# Patient Record
Sex: MALE | Race: WHITE | NOT HISPANIC OR LATINO | Employment: UNEMPLOYED | URBAN - METROPOLITAN AREA
[De-identification: names, ages, dates, MRNs, and addresses within clinical notes are randomized per-mention and may not be internally consistent; named-entity substitution may affect disease eponyms.]

---

## 2017-12-11 ENCOUNTER — HOSPITAL ENCOUNTER (INPATIENT)
Facility: HOSPITAL | Age: 37
LOS: 4 days | Discharge: HOME | DRG: 751 | End: 2017-12-15
Attending: EMERGENCY MEDICINE | Admitting: PSYCHIATRY & NEUROLOGY
Payer: COMMERCIAL

## 2017-12-11 ENCOUNTER — APPOINTMENT (EMERGENCY)
Dept: RADIOLOGY | Facility: HOSPITAL | Age: 37
DRG: 751 | End: 2017-12-11
Payer: COMMERCIAL

## 2017-12-11 ENCOUNTER — HOSPITAL ENCOUNTER (EMERGENCY)
Facility: HOSPITAL | Age: 37
Discharge: HOME | End: 2017-12-11
Attending: EMERGENCY MEDICINE
Payer: COMMERCIAL

## 2017-12-11 VITALS
RESPIRATION RATE: 18 BRPM | SYSTOLIC BLOOD PRESSURE: 121 MMHG | HEIGHT: 75 IN | HEART RATE: 88 BPM | DIASTOLIC BLOOD PRESSURE: 73 MMHG | BODY MASS INDEX: 28.6 KG/M2 | OXYGEN SATURATION: 99 % | WEIGHT: 230 LBS

## 2017-12-11 DIAGNOSIS — F41.1 GENERALIZED ANXIETY DISORDER: ICD-10-CM

## 2017-12-11 DIAGNOSIS — F32.A DEPRESSION: Primary | ICD-10-CM

## 2017-12-11 DIAGNOSIS — F33.2 SEVERE EPISODE OF RECURRENT MAJOR DEPRESSIVE DISORDER, WITHOUT PSYCHOTIC FEATURES (HCC): Primary | ICD-10-CM

## 2017-12-11 DIAGNOSIS — F11.20 OPIOID DEPENDENCE ON MAINTENANCE AGONIST THERAPY, NO SYMPTOMS (HCC): ICD-10-CM

## 2017-12-11 DIAGNOSIS — G47.00 INSOMNIA: ICD-10-CM

## 2017-12-11 LAB
ALBUMIN SERPL BCP-MCNC: 4.2 G/DL (ref 3.5–5)
ALP SERPL-CCNC: 107 U/L (ref 46–116)
ALT SERPL W P-5'-P-CCNC: 43 U/L (ref 12–78)
AMPHETAMINES SERPL QL SCN: POSITIVE
ANION GAP SERPL CALCULATED.3IONS-SCNC: 14 MMOL/L (ref 4–13)
AST SERPL W P-5'-P-CCNC: 45 U/L (ref 5–45)
BARBITURATES UR QL: NEGATIVE
BASOPHILS # BLD AUTO: 0.02 THOUSANDS/ΜL (ref 0–0.1)
BASOPHILS NFR BLD AUTO: 0 % (ref 0–1)
BENZODIAZ UR QL: POSITIVE
BILIRUB SERPL-MCNC: 0.9 MG/DL (ref 0.2–1)
BUN SERPL-MCNC: 15 MG/DL (ref 5–25)
CALCIUM SERPL-MCNC: 9.1 MG/DL (ref 8.3–10.1)
CHLORIDE SERPL-SCNC: 107 MMOL/L (ref 100–108)
CO2 SERPL-SCNC: 26 MMOL/L (ref 21–32)
COCAINE UR QL: POSITIVE
CREAT SERPL-MCNC: 1.07 MG/DL (ref 0.6–1.3)
EOSINOPHIL # BLD AUTO: 0.28 THOUSAND/ΜL (ref 0–0.61)
EOSINOPHIL NFR BLD AUTO: 3 % (ref 0–6)
ERYTHROCYTE [DISTWIDTH] IN BLOOD BY AUTOMATED COUNT: 14.1 % (ref 11.6–15.1)
ETHANOL EXG-MCNC: 0 MG/DL
ETHANOL SERPL-MCNC: <3 MG/DL (ref 0–3)
GFR SERPL CREATININE-BSD FRML MDRD: 87 ML/MIN/1.73SQ M
GLUCOSE SERPL-MCNC: 117 MG/DL (ref 65–140)
HCT VFR BLD AUTO: 40.4 % (ref 36.5–49.3)
HGB BLD-MCNC: 14.2 G/DL (ref 12–17)
LYMPHOCYTES # BLD AUTO: 1.59 THOUSANDS/ΜL (ref 0.6–4.47)
LYMPHOCYTES NFR BLD AUTO: 19 % (ref 14–44)
MCH RBC QN AUTO: 29.2 PG (ref 26.8–34.3)
MCHC RBC AUTO-ENTMCNC: 35.1 G/DL (ref 31.4–37.4)
MCV RBC AUTO: 83 FL (ref 82–98)
METHADONE UR QL: NEGATIVE
MONOCYTES # BLD AUTO: 0.77 THOUSAND/ΜL (ref 0.17–1.22)
MONOCYTES NFR BLD AUTO: 9 % (ref 4–12)
NEUTROPHILS # BLD AUTO: 5.85 THOUSANDS/ΜL (ref 1.85–7.62)
NEUTS SEG NFR BLD AUTO: 69 % (ref 43–75)
OPIATES UR QL SCN: NEGATIVE
PCP UR QL: NEGATIVE
PLATELET # BLD AUTO: 218 THOUSANDS/UL (ref 149–390)
PMV BLD AUTO: 12 FL (ref 8.9–12.7)
POTASSIUM SERPL-SCNC: 3.7 MMOL/L (ref 3.5–5.3)
PROT SERPL-MCNC: 7.1 G/DL (ref 6.4–8.2)
RBC # BLD AUTO: 4.86 MILLION/UL (ref 3.88–5.62)
SODIUM SERPL-SCNC: 147 MMOL/L (ref 136–145)
THC UR QL: POSITIVE
WBC # BLD AUTO: 8.51 THOUSAND/UL (ref 4.31–10.16)

## 2017-12-11 PROCEDURE — 85025 COMPLETE CBC W/AUTO DIFF WBC: CPT | Performed by: EMERGENCY MEDICINE

## 2017-12-11 PROCEDURE — 99285 EMERGENCY DEPT VISIT HI MDM: CPT

## 2017-12-11 PROCEDURE — 80320 DRUG SCREEN QUANTALCOHOLS: CPT | Performed by: EMERGENCY MEDICINE

## 2017-12-11 PROCEDURE — 82075 ASSAY OF BREATH ETHANOL: CPT | Performed by: EMERGENCY MEDICINE

## 2017-12-11 PROCEDURE — 99284 EMERGENCY DEPT VISIT MOD MDM: CPT

## 2017-12-11 PROCEDURE — 36415 COLL VENOUS BLD VENIPUNCTURE: CPT | Performed by: EMERGENCY MEDICINE

## 2017-12-11 PROCEDURE — 80053 COMPREHEN METABOLIC PANEL: CPT | Performed by: EMERGENCY MEDICINE

## 2017-12-11 PROCEDURE — 73130 X-RAY EXAM OF HAND: CPT

## 2017-12-11 PROCEDURE — 80307 DRUG TEST PRSMV CHEM ANLYZR: CPT | Performed by: EMERGENCY MEDICINE

## 2017-12-11 RX ORDER — TRAZODONE HYDROCHLORIDE 50 MG/1
50 TABLET ORAL
COMMUNITY
End: 2017-12-15 | Stop reason: HOSPADM

## 2017-12-11 RX ORDER — TRAZODONE HYDROCHLORIDE 50 MG/1
50 TABLET ORAL
Status: DISCONTINUED | OUTPATIENT
Start: 2017-12-11 | End: 2017-12-15 | Stop reason: HOSPADM

## 2017-12-11 RX ORDER — HALOPERIDOL 5 MG
5 TABLET ORAL EVERY 6 HOURS PRN
Status: DISCONTINUED | OUTPATIENT
Start: 2017-12-11 | End: 2017-12-15 | Stop reason: HOSPADM

## 2017-12-11 RX ORDER — OLANZAPINE 10 MG/1
10 TABLET ORAL
Status: DISCONTINUED | OUTPATIENT
Start: 2017-12-11 | End: 2017-12-15 | Stop reason: HOSPADM

## 2017-12-11 RX ORDER — BUPRENORPHINE HYDROCHLORIDE AND NALOXONE HYDROCHLORIDE DIHYDRATE 8; 2 MG/1; MG/1
1 TABLET SUBLINGUAL DAILY
Status: ON HOLD | COMMUNITY
End: 2017-12-14

## 2017-12-11 RX ORDER — MAGNESIUM HYDROXIDE/ALUMINUM HYDROXICE/SIMETHICONE 120; 1200; 1200 MG/30ML; MG/30ML; MG/30ML
30 SUSPENSION ORAL EVERY 4 HOURS PRN
Status: DISCONTINUED | OUTPATIENT
Start: 2017-12-11 | End: 2017-12-15 | Stop reason: HOSPADM

## 2017-12-11 RX ORDER — DIAZEPAM 10 MG/1
5 TABLET ORAL EVERY 6 HOURS PRN
COMMUNITY
End: 2017-12-15 | Stop reason: HOSPADM

## 2017-12-11 RX ORDER — BUPROPION HYDROCHLORIDE 300 MG/1
300 TABLET ORAL DAILY
COMMUNITY
End: 2017-12-15 | Stop reason: HOSPADM

## 2017-12-11 RX ORDER — LORAZEPAM 2 MG/ML
2 INJECTION INTRAMUSCULAR EVERY 6 HOURS PRN
Status: DISCONTINUED | OUTPATIENT
Start: 2017-12-11 | End: 2017-12-15 | Stop reason: HOSPADM

## 2017-12-11 RX ORDER — BENZTROPINE MESYLATE 1 MG/1
1 TABLET ORAL EVERY 6 HOURS PRN
Status: DISCONTINUED | OUTPATIENT
Start: 2017-12-11 | End: 2017-12-15 | Stop reason: HOSPADM

## 2017-12-11 RX ORDER — BENZTROPINE MESYLATE 1 MG/ML
1 INJECTION INTRAMUSCULAR; INTRAVENOUS EVERY 6 HOURS PRN
Status: DISCONTINUED | OUTPATIENT
Start: 2017-12-11 | End: 2017-12-15 | Stop reason: HOSPADM

## 2017-12-11 RX ORDER — BUPRENORPHINE AND NALOXONE 8; 2 MG/1; MG/1
1 FILM, SOLUBLE BUCCAL; SUBLINGUAL DAILY
Status: DISCONTINUED | OUTPATIENT
Start: 2017-12-11 | End: 2017-12-15 | Stop reason: HOSPADM

## 2017-12-11 RX ORDER — ACETAMINOPHEN 325 MG/1
650 TABLET ORAL EVERY 6 HOURS PRN
Status: DISCONTINUED | OUTPATIENT
Start: 2017-12-11 | End: 2017-12-15 | Stop reason: HOSPADM

## 2017-12-11 RX ORDER — RISPERIDONE 1 MG/1
1 TABLET, ORALLY DISINTEGRATING ORAL
Status: DISCONTINUED | OUTPATIENT
Start: 2017-12-11 | End: 2017-12-15 | Stop reason: HOSPADM

## 2017-12-11 RX ORDER — OLANZAPINE 10 MG/1
10 INJECTION, POWDER, LYOPHILIZED, FOR SOLUTION INTRAMUSCULAR
Status: DISCONTINUED | OUTPATIENT
Start: 2017-12-11 | End: 2017-12-15 | Stop reason: HOSPADM

## 2017-12-11 RX ORDER — HALOPERIDOL 5 MG/ML
5 INJECTION INTRAMUSCULAR EVERY 6 HOURS PRN
Status: DISCONTINUED | OUTPATIENT
Start: 2017-12-11 | End: 2017-12-15 | Stop reason: HOSPADM

## 2017-12-11 RX ORDER — HYDROXYZINE 50 MG/1
50 TABLET, FILM COATED ORAL EVERY 6 HOURS PRN
Status: DISCONTINUED | OUTPATIENT
Start: 2017-12-11 | End: 2017-12-15 | Stop reason: HOSPADM

## 2017-12-11 RX ADMIN — BUPRENORPHINE HYDROCHLORIDE, NALOXONE HYDROCHLORIDE 1 FILM: 8; 2 FILM, SOLUBLE BUCCAL; SUBLINGUAL at 15:35

## 2017-12-11 NOTE — PROGRESS NOTES
Pt remains mostly seclusive to room  Calm and cooperative during assessment with this writer however has slight irritable edge  Pt states feeling tired and wanting to rest however mostly awake this evening  Pt report SI-no plan  Verbally contracts for safety  Denies HI, AH/VH  Denies w/d s/s and would not discuss substance abuse with this writer  Remains scant and guarded  States "I'm ok right now, I just want to rest " Encouraged pt to notify staff if having any concerns or questions  Pt agreeable  Will monitor

## 2017-12-11 NOTE — ED NOTES
Pt to ED for second time, had left ED discharged, but when police were called to remove him from property, pt told police he would walk into traffic  Pt received another DUI yesterday and has been kicked out by his father earlier out of his car into the parking lot  Police present and filed a [phone] petition for pt making suicidal statements  Police present while pt changed into blue paper clothing, gave urine sample and blood drawn for basic labs  Pt given water, taking PO fluids well       Gauri Bird RN  12/11/17 Angella Pitts RN  12/11/17 2547

## 2017-12-11 NOTE — DISCHARGE INSTRUCTIONS
Depression   WHAT YOU NEED TO KNOW:   Depression is a medical condition that causes feelings of sadness or hopelessness that do not go away  Depression may cause you to lose interest in things you used to enjoy  These feelings may interfere with your daily life  DISCHARGE INSTRUCTIONS:   Call 911 for any of the following:   · You think about harming yourself or someone else  Contact your healthcare provider if:   · Your symptoms do not improve  · You cannot make it to your next appointment  · You have new symptoms  · You have questions or concerns about your condition or care  Medicines:   · Antidepressants  may be given to improve or balance your mood  You may need to take this medicine for several weeks before you begin to feel better  · Take your medicine as directed  Contact your healthcare provider if you think your medicine is not helping or if you have side effects  Tell him of her if you are allergic to any medicine  Keep a list of the medicines, vitamins, and herbs you take  Include the amounts, and when and why you take them  Bring the list or the pill bottles to follow-up visits  Carry your medicine list with you in case of an emergency  Therapy  may be used to treat your depression  A therapist will help you learn to cope with your thoughts and feelings  This can be done alone or in a group  It may also be done with family members or a significant other  Self-care:   · Get regular physical activity  Try to exercise for 30 minutes, 3 to 5 days a week  Work with your healthcare provider to develop an exercise plan that you enjoy  Physical activity may improve your symptoms  · Get enough sleep  Create a routine to help you relax before bed  You can listen to music, read, or do yoga  Try to go to bed and wake up at the same time every day  Sleep is important for emotional health  · Eat a variety of healthy foods from all of the food groups    A healthy meal plan is low in fat, salt, and added sugar  Ask your healthcare provider for more information about a meal plan that is right for you  · Do not drink alcohol or use drugs  Alcohol and drugs can make your symptoms worse  Follow up with your healthcare provider as directed: Your healthcare provider will monitor your progress at follow-up visits  He or she will also monitor your medicine if you take antidepressants  Your healthcare provider will ask if the medicine is helping  Tell him or her about any side effects or problems you may have with your medicine  The type or amount of medicine may need to be changed  Write down your questions so you remember to ask them during your visits  © 2017 [employer] Information is for End User's use only and may not be sold, redistributed or otherwise used for commercial purposes  All illustrations and images included in CareNotes® are the copyrighted property of [employer]  or Alistair Neff  The above information is an  only  It is not intended as medical advice for individual conditions or treatments  Talk to your doctor, nurse or pharmacist before following any medical regimen to see if it is safe and effective for you

## 2017-12-11 NOTE — ED NOTES
[facility] from Crisis states this pt is in for a bed search but waiting for discharges        Deny Jorgensen  12/11/17 3221

## 2017-12-11 NOTE — ED NOTES
Pt to ED after being kicked out of his father's car on the property  Pt will not comment on drug abuse  Pt not cooperative with assessment and will not change into blue scrubs at this time  Pt does deny SI/HI and any type of auditory or visual hallucinations       Vandana Goyal RN  12/11/17 2182

## 2017-12-11 NOTE — ED NOTES
Patient reportedly called his father from [facility] asking for a ride to this area earlier today  His father refused unless the patient agreed to "get help"  The  involved says that the patient agreed until his father arrived, then pt got into father's car and stated he wasn't going anywhere to get help  Reportedly police in [place] were involved in getting the patient to either agree to treatment or leave his father's car  The pt again agreed and father drove him to this ED  Pt then refused to get out of the car and said he wouldn't get help  The police arrived on the scene and ultimately the father got the patient out of his car and drove away  Pt was sent into the ED by the police  Once inside, the pt refused to change, denied si, hi, psychosis, refused a drug screen, and said he needed and wanted nothing  He was discharged and went to the lobby to try to call his father to come get him  Father refused, and the patient refused to leave the waiting room  Eventually the police were called back and they told the patient he had to leave the hospital grounds  The pt then told the officer that if he was forced to leave the hospital lobby he would walk [place] traffic [address] to try to kill himself  Pt also said that he was more depressed recently, and that he had felt like this in the past and tried to harm himself when he did  Officer petitioned a 36, but the patient stated he wanted to sign in voluntarily  Once the police left the ED the pt then denied being suicidal and said he was able to "contract for safety"  Pt reported several seemingly contradictory residences [facility] for the past 6 months, [place] where he said he sees a psychiatrist monthly, [place] where his father picked him up)  Throughout the evaluation the pt kept rolling his eyes back behind the lids and mumbling and pretending to fall asleep   He refused to answer several questions, but consistently denied HI and psychosis

## 2017-12-11 NOTE — ED NOTES
Patient denied substance abuse aside from suboxone which he said was prescribed for him by "some doctor in [facility]" for the past 10 years  His drug screen was positive for amphetamines/methamphetamines, benzos, cocaine and thc  He denied using any of those drugs, and wouldn't answer and pretending to fall asleep when questioned about the positive drug screen

## 2017-12-11 NOTE — ED PROVIDER NOTES
History  Chief Complaint   Patient presents with    Psychiatric Evaluation     Pt to ED via [facility] police, 396, pt told them he was going to walk into traffic  Brought in by police for 025  Pt in department earlier for depression - not very cooperative with interview stating he was being forced to come in by father  Hx of depression w/ previous suicide attempt  Earlier had denied SI/HI  Pt later found loitering on property and police called  Pt reportedly made statements to police that he was going to jump into traffic to kill himself  Pt w/ previous psychiatric admissions for depression in 2014 and 2015  Hx of substance abuse  Refuses to answer any questions regarding substance abuse, denies alcohol use  Pt     Of note dorsum of right hand is swollen and reddened  Mild increased warmth  Denies any specific injury to the hand  Notes he was in handcuffs 'for an extended period of time' on Monday after being found sleeping in his truck by a [facility]  States officer was concerned he was driving under the influence and had labs done  Prior to Admission Medications   Prescriptions Last Dose Informant Patient Reported? Taking?    buPROPion (WELLBUTRIN XL) 300 mg 24 hr tablet Past Week at Unknown time  Yes Yes   Sig: Take 300 mg by mouth daily   buprenorphine-naloxone (SUBOXONE) 8-2 mg per SL tablet 12/10/2017 at Unknown time  Yes Yes   Sig: Place 1 tablet under the tongue daily   diazepam (VALIUM) 10 mg tablet Past Month at Unknown time  Yes Yes   Sig: Take 5 mg by mouth every 6 (six) hours as needed for anxiety   traZODone (DESYREL) 50 mg tablet More than a month at Unknown time  Yes No   Sig: Take 50 mg by mouth daily at bedtime      Facility-Administered Medications: None       Past Medical History:   Diagnosis Date    Alcohol abuse     Depression     Substance abuse        Past Surgical History:   Procedure Laterality Date    ABDOMINAL SURGERY      ANKLE SURGERY History reviewed  No pertinent family history  I have reviewed and agree with the history as documented      Social History   Substance Use Topics    Smoking status: Current Every Day Smoker     Packs/day: 1 00     Years: 15 00     Types: Cigarettes    Smokeless tobacco: Current User    Alcohol use No      Comment: Denies use, recent DUI        Review of Systems    Physical Exam  ED Triage Vitals   Temperature Pulse Respirations Blood Pressure SpO2   12/11/17 0422 12/11/17 0422 12/11/17 0422 12/11/17 0422 12/11/17 0422   (!) 96 5 °F (35 8 °C) 85 18 123/65 98 %      Temp Source Heart Rate Source Patient Position - Orthostatic VS BP Location FiO2 (%)   12/11/17 1254 12/11/17 0422 12/11/17 0422 12/11/17 0422 --   Tympanic Monitor Lying Right arm       Pain Score       12/11/17 0422       No Pain           Orthostatic Vital Signs  Vitals:    12/13/17 0728 12/13/17 1226 12/13/17 1540 12/13/17 2005   BP: 112/58 140/87 124/78 122/77   Pulse: 72 92 71 77   Patient Position - Orthostatic VS: Lying Standing Standing Sitting       Physical Exam    ED Medications  Medications   hydrOXYzine HCL (ATARAX) tablet 50 mg (50 mg Oral Not Given 12/12/17 1010)   LORazepam (ATIVAN) 2 mg/mL injection 2 mg (not administered)   traZODone (DESYREL) tablet 50 mg (not administered)   risperiDONE (RisperDAL M-TABS) dispersible tablet 1 mg (not administered)   nicotine polacrilex (NICORETTE) gum 2 mg (not administered)   haloperidol (HALDOL) tablet 5 mg (not administered)   haloperidol lactate (HALDOL) injection 5 mg (not administered)   OLANZapine (ZyPREXA) tablet 10 mg (not administered)   OLANZapine (ZyPREXA) IM injection 10 mg (not administered)   magnesium hydroxide (MILK OF MAGNESIA) 400 mg/5 mL oral suspension 30 mL (not administered)   aluminum-magnesium hydroxide-simethicone (MYLANTA) 200-200-20 mg/5 mL oral suspension 30 mL (not administered)   acetaminophen (TYLENOL) tablet 650 mg (not administered)   benztropine (COGENTIN) tablet 1 mg (not administered)   benztropine (COGENTIN) injection 1 mg (not administered)   buprenorphine-naloxone (SUBOXONE) 8-2 mg per SL film 1 Film (1 Film Sublingual Given 12/13/17 0944)   influenza inactivated quadrivalent vaccine (FLULAVAL) IM injection 0 5 mL (not administered)   nicotine (NICODERM CQ) 21 mg/24 hr TD 24 hr patch 1 patch (1 patch Transdermal Medication Applied 12/13/17 0944)   buPROPion (WELLBUTRIN XL) 24 hr tablet 300 mg (300 mg Oral Given 12/13/17 0943)   buprenorphine-naloxone (SUBOXONE) 2-0 5 mg per SL film 2 Film (2 Film Sublingual Given 12/13/17 0944)   zolpidem (AMBIEN) tablet 10 mg (10 mg Oral Given 12/13/17 2123)       Diagnostic Studies  Results Reviewed     Procedure Component Value Units Date/Time    Comprehensive metabolic panel [[id number]]  (Abnormal) Collected:  12/11/17 0415    Lab Status:  Final result Specimen:  Blood from Arm, Left Updated:  12/11/17 0450     Sodium 147 (H) mmol/L      Potassium 3 7 mmol/L      Chloride 107 mmol/L      CO2 26 mmol/L      Anion Gap 14 (H) mmol/L      BUN 15 mg/dL      Creatinine 1 07 mg/dL      Glucose 117 mg/dL      Calcium 9 1 mg/dL      AST 45 U/L      ALT 43 U/L      Alkaline Phosphatase 107 U/L      Total Protein 7 1 g/dL      Albumin 4 2 g/dL      Total Bilirubin 0 90 mg/dL      eGFR 87 ml/min/1 73sq m     Narrative:         National Kidney Disease Education Program recommendations are as follows:  GFR calculation is accurate only with a steady state creatinine  Chronic Kidney disease less than 60 ml/min/1 73 sq  meters  Kidney failure less than 15 ml/min/1 73 sq  meters      Ethanol [[id number]]  (Normal) Collected:  12/11/17 0415    Lab Status:  Final result Specimen:  Blood from Arm, Left Updated:  12/11/17 0446     Ethanol Lvl <3 mg/dL     Rapid drug screen, urine [[id number]]  (Abnormal) Collected:  12/11/17 0412    Lab Status:  Final result Specimen:  Urine from Urine, Clean Catch Updated:  12/11/17 0435     Amph/Meth UR Positive (A) Barbiturate Ur Negative     Benzodiazepine Urine Positive (A)     Cocaine Urine Positive (A)     Methadone Urine Negative     Opiate Urine Negative     PCP Ur Negative     THC Urine Positive (A)    Narrative:         Presumptive report  If requested, specimen will be sent to reference lab for confirmation  FOR MEDICAL PURPOSES ONLY  IF CONFIRMATION NEEDED PLEASE CONTACT THE LAB WITHIN 5 DAYS  Drug Screen Cutoff Levels:  AMPHETAMINE/METHAMPHETAMINES  1000 ng/mL  BARBITURATES     200 ng/mL  BENZODIAZEPINES     200 ng/mL  COCAINE      300 ng/mL  METHADONE      300 ng/mL  OPIATES      300 ng/mL  PHENCYCLIDINE     25 ng/mL  THC       50 ng/mL    CBC and differential [[id number]]  (Normal) Collected:  12/11/17 0418    Lab Status:  Final result Specimen:  Blood from Arm, Left Updated:  12/11/17 0426     WBC 8 51 Thousand/uL      RBC 4 86 Million/uL      Hemoglobin 14 2 g/dL      Hematocrit 40 4 %      MCV 83 fL      MCH 29 2 pg      MCHC 35 1 g/dL      RDW 14 1 %      MPV 12 0 fL      Platelets 416 Thousands/uL      Neutrophils Relative 69 %      Lymphocytes Relative 19 %      Monocytes Relative 9 %      Eosinophils Relative 3 %      Basophils Relative 0 %      Neutrophils Absolute 5 85 Thousands/µL      Lymphocytes Absolute 1 59 Thousands/µL      Monocytes Absolute 0 77 Thousand/µL      Eosinophils Absolute 0 28 Thousand/µL      Basophils Absolute 0 02 Thousands/µL                  XR hand 3+ views RIGHT   Final Result by Amelia Cooley MD (12/11 9900)      No acute osseous abnormality           Workstation performed: [id number]                    Procedures  Procedures       Phone Contacts  ED Phone Contact    ED Course  ED Course                                MDM    CritCare Time    Disposition  Final diagnoses:   Severe episode of recurrent major depressive disorder, without psychotic features ([facility] [address] )     Time reflects when diagnosis was documented in both MDM as applicable and the Disposition within this note Time User Action Codes Description Comment    12/14/2017  6:56 AM Maximiliano Kennedy Add [F33 2] Severe episode of recurrent major depressive disorder, without psychotic features [facility])       ED Disposition     None      MD Documentation    Flowsheet Row Most Recent Value   Sending MD Kennedy      Follow-up Information     Follow up With Specialties Details Why Contact Info    Toy Cuevas MD  Go on 1/4/2018 @ 3:30 PM: medication management appointment [address] [place] [address]          Current Discharge Medication List      CONTINUE these medications which have NOT CHANGED    Details   buprenorphine-naloxone (SUBOXONE) 8-2 mg per SL tablet Place 1 tablet under the tongue daily      buPROPion (WELLBUTRIN XL) 300 mg 24 hr tablet Take 300 mg by mouth daily      diazepam (VALIUM) 10 mg tablet Take 5 mg by mouth every 6 (six) hours as needed for anxiety      traZODone (DESYREL) 50 mg tablet Take 50 mg by mouth daily at bedtime           No discharge procedures on file      ED Provider  Electronically Signed by           Radha Owens DO  12/14/17 5429

## 2017-12-11 NOTE — ED PROVIDER NOTES
History  Chief Complaint   Patient presents with    Depression     Pt reports to ED because his dad thinks he needs to be hospitalized, has been depressed for some time, but denies SI/HI  Pt dropped off by father for evaluation  Reportedly father concerned pt w/ worsening depression  Father not here to provide information  Pt states hx of depression w/ suicide attempt in past   On meds but doesn't feel like they are working  Doesn't have a therapist/counselor  Refuses to answer any questions regarding substance use/abuse  Pt denies any SI/HI  Denies vis/aud hallucinations  Does not want to speak to crisis        History provided by:  Patient   used: No    Depression   Presenting symptoms: depression    Presenting symptoms: no suicidal thoughts and no suicidal threats    Patient accompanied by: none  Degree of incapacity (severity): Unable to specify  Onset quality:  Gradual  Timing:  Constant  Progression:  Waxing and waning  Chronicity:  Recurrent  Context: not recent medication change and not stressful life event    Treatment compliance: All of the time  Relieved by:  None tried  Worsened by:  Nothing  Ineffective treatments:  None tried  Associated symptoms comment:  Refusing to answer any other questions  Risk factors: hx of mental illness        Prior to Admission Medications   Prescriptions Last Dose Informant Patient Reported? Taking? buPROPion (WELLBUTRIN XL) 300 mg 24 hr tablet   Yes Yes   Sig: Take 300 mg by mouth daily   diazepam (VALIUM) 10 mg tablet   Yes Yes   Sig: Take 5 mg by mouth every 6 (six) hours as needed for anxiety   traZODone (DESYREL) 50 mg tablet   Yes Yes   Sig: Take 50 mg by mouth daily at bedtime      Facility-Administered Medications: None       History reviewed  No pertinent past medical history  Past Surgical History:   Procedure Laterality Date    ABDOMINAL SURGERY      ANKLE SURGERY         History reviewed   No pertinent family history  I have reviewed and agree with the history as documented  Social History   Substance Use Topics    Smoking status: Current Every Day Smoker    Smokeless tobacco: Current User    Alcohol use No        Review of Systems   Unable to perform ROS: Psychiatric disorder   Psychiatric/Behavioral: Positive for depression  Negative for suicidal ideas  Physical Exam  ED Triage Vitals [12/11/17 0248]   Temp Pulse Respirations Blood Pressure SpO2   -- 88 18 121/73 99 %      Temp src Heart Rate Source Patient Position - Orthostatic VS BP Location FiO2 (%)   -- Monitor Sitting Right arm --      Pain Score       --           Orthostatic Vital Signs  Vitals:    12/11/17 0248   BP: 121/73   Pulse: 88   Patient Position - Orthostatic VS: Sitting       Physical Exam   Constitutional: He appears well-developed and well-nourished  HENT:   Nose: Nose normal    Eyes: Conjunctivae are normal    Neck: Neck supple  Cardiovascular: Normal rate  Pulmonary/Chest: Effort normal    Neurological: He is alert  Skin: Skin is warm  Psychiatric: His affect is inappropriate  He is withdrawn  He expresses no homicidal and no suicidal ideation  He expresses no suicidal plans and no homicidal plans  Nursing note and vitals reviewed        ED Medications  Medications - No data to display    Diagnostic Studies  Results Reviewed     Procedure Component Value Units Date/Time    POCT alcohol breath test [[id number]]  (Normal) Resulted:  12/11/17 0255    Lab Status:  Final result Updated:  12/11/17 0255     EXTBreath Alcohol 0 00                 No orders to display              Procedures  Procedures       Phone Contacts  ED Phone Contact    ED Course  ED Course                                MDM  Number of Diagnoses or Management Options  Depression: established and worsening    CritCare Time    Disposition  Final diagnoses:   Depression     Time reflects when diagnosis was documented in both MDM as applicable and the Disposition within this note     Time User Action Codes Description Comment    12/11/2017  3:01 AM Mayra Romanian Add [F32 9] Depression       ED Disposition     ED Disposition Condition Comment    Discharge  Nedonna Jeffers discharge to home/self care  Condition at discharge: Good        Follow-up Information     Follow up With Specialties Details Why [facility] Schedule an appointment as soon as possible for a visit As needed [address] [address] [address]          Discharge Medication List as of 12/11/2017  3:02 AM      CONTINUE these medications which have NOT CHANGED    Details   buPROPion (WELLBUTRIN XL) 300 mg 24 hr tablet Take 300 mg by mouth daily, Historical Med      diazepam (VALIUM) 10 mg tablet Take 5 mg by mouth every 6 (six) hours as needed for anxiety, Historical Med      traZODone (DESYREL) 50 mg tablet Take 50 mg by mouth daily at bedtime, Historical Med           No discharge procedures on file      ED Provider  Electronically Signed by           Wilbert Wheeler DO  12/11/17 0503

## 2017-12-11 NOTE — ED NOTES
Pt provided with safe finger food breakfast tray and is currently eating        Little Dye, RN  12/11/17 2006

## 2017-12-11 NOTE — ED NOTES
RN updating pt about process and the possibility that labs may be ordered, pt states "well that depends on what the labs are for, I will not submit to a drug test "     Larry Gil RN  12/11/17 4750

## 2017-12-11 NOTE — ED NOTES
Patient signed a 12  EVS indicates he is MA eligible through [facility]  Crisis worker contacted [facility] to obtain preauthorization  Currently in the [facility] return call que awaiting call

## 2017-12-11 NOTE — PLAN OF CARE
RN will meet with patient twice a day to assess for any concerns, teach about prescribed medications and diagnoses  Patient will be taught and encouraged to utilize healthy coping skills

## 2017-12-11 NOTE — ED NOTES
Pt appears very sleepy, is easily arousable when spoken to but unable to keep eyes fully open for more than a few seconds, speak slurred, vitals stable as noted, Dr Ciarra Alicia made aware        Lin Arrington RN  12/11/17 5653

## 2017-12-11 NOTE — PROGRESS NOTES
[address]- Patient expressing suicidal ideation (running into traffic) after father refused to get him  Came into unit with abrasions on wrists  Wrists were red and slightly swollen  Xrays were taken at ED, negative for fracture  Patient said that he is homeless  Recently in residential for DUI, although denied all alcohol use  Said that he used opiates but would not specify which he used and how much  Barely cooperative  Patient had past history of MRSA in L ankle many years ago continues to have pain with that injury  Currently is on Suboxone maintenance  Made MD aware

## 2017-12-11 NOTE — ED NOTES
Lesvia Martinez, [facility], authorized 2 nights acute/dual inpatient treatment  [place] requests that accepting facility contact them upon admission to receive authorization number  [phone]

## 2017-12-12 PROBLEM — F14.10 COCAINE ABUSE (HCC): Status: ACTIVE | Noted: 2017-12-12

## 2017-12-12 PROBLEM — F41.1 GENERALIZED ANXIETY DISORDER: Status: ACTIVE | Noted: 2017-12-12

## 2017-12-12 PROBLEM — F33.2 SEVERE EPISODE OF RECURRENT MAJOR DEPRESSIVE DISORDER, WITHOUT PSYCHOTIC FEATURES (HCC): Status: ACTIVE | Noted: 2017-12-12

## 2017-12-12 RX ORDER — BUPROPION HYDROCHLORIDE 300 MG/1
300 TABLET ORAL DAILY
Status: DISCONTINUED | OUTPATIENT
Start: 2017-12-12 | End: 2017-12-15 | Stop reason: HOSPADM

## 2017-12-12 RX ORDER — BUPRENORPHINE AND NALOXONE 2; .5 MG/1; MG/1
2 FILM, SOLUBLE BUCCAL; SUBLINGUAL DAILY
Status: DISCONTINUED | OUTPATIENT
Start: 2017-12-12 | End: 2017-12-15 | Stop reason: HOSPADM

## 2017-12-12 RX ORDER — NICOTINE 21 MG/24HR
1 PATCH, TRANSDERMAL 24 HOURS TRANSDERMAL DAILY
Status: DISCONTINUED | OUTPATIENT
Start: 2017-12-12 | End: 2017-12-15 | Stop reason: HOSPADM

## 2017-12-12 RX ORDER — ZOLPIDEM TARTRATE 5 MG/1
5 TABLET ORAL
Status: DISCONTINUED | OUTPATIENT
Start: 2017-12-12 | End: 2017-12-13

## 2017-12-12 RX ADMIN — BUPROPION HYDROCHLORIDE 300 MG: 300 TABLET, FILM COATED, EXTENDED RELEASE ORAL at 10:11

## 2017-12-12 RX ADMIN — BUPRENORPHINE HYDROCHLORIDE, NALOXONE HYDROCHLORIDE 1 FILM: 8; 2 FILM, SOLUBLE BUCCAL; SUBLINGUAL at 10:10

## 2017-12-12 RX ADMIN — NICOTINE 1 PATCH: 21 PATCH, EXTENDED RELEASE TRANSDERMAL at 10:10

## 2017-12-12 RX ADMIN — BUPRENORPHINE HYDROCHLORIDE, NALOXONE HYDROCHLORIDE 2 FILM: 2; .5 FILM, SOLUBLE BUCCAL; SUBLINGUAL at 12:06

## 2017-12-12 RX ADMIN — HYDROXYZINE HYDROCHLORIDE 50 MG: 50 TABLET, FILM COATED ORAL at 10:09

## 2017-12-12 RX ADMIN — ZOLPIDEM TARTRATE 5 MG: 5 TABLET ORAL at 22:03

## 2017-12-12 NOTE — H&P
Chief Complaint:  As per psychiatry      History of Present Illness:  26-year-old male with depression who was brought into the emergency department due to being destructive a property  Patient with history of depression and was making suicidal threats  Patient without hallucinations  Patient's drug screen positive for cocaine,  Benzos, amphetamines and THC  Patient denies fevers, chills, shortness of breath, chest pain or palpitation  Patient without any co morbidities  Past Medical History:   Past Medical History:   Diagnosis Date    Alcohol abuse     Depression     Substance abuse          Past Surgical History:    Past Surgical History:   Procedure Laterality Date    ABDOMINAL SURGERY      ANKLE SURGERY           Allergies:     Allergies   Allergen Reactions    Nuts Anaphylaxis     [place]    Vancomycin Anaphylaxis    Venomil Honey Bee Venom [Honey Bee Venom] Anaphylaxis         Medications:    Current Facility-Administered Medications:     acetaminophen (TYLENOL) tablet 650 mg, 650 mg, Oral, Q6H PRN, Silke Long PA-C    aluminum-magnesium hydroxide-simethicone (MYLANTA) 200-200-20 mg/5 mL oral suspension 30 mL, 30 mL, Oral, Q4H PRN, Silke Long PA-C    benztropine (COGENTIN) injection 1 mg, 1 mg, Intramuscular, Q6H PRN, Silke Long PA-C    benztropine (COGENTIN) tablet 1 mg, 1 mg, Oral, Q6H PRN, Silke Long PA-C    buprenorphine-naloxone (SUBOXONE) 8-2 mg per SL film 1 Film, 1 Film, Sublingual, Daily, Charles Franco, 1 Film at 12/11/17 1535    haloperidol (HALDOL) tablet 5 mg, 5 mg, Oral, Q6H PRN, Silke Long PA-C    haloperidol lactate (HALDOL) injection 5 mg, 5 mg, Intramuscular, Q6H PRN, Silke Long PA-C    hydrOXYzine HCL (ATARAX) tablet 50 mg, 50 mg, Oral, Q6H PRN, Silke Long PA-C    influenza inactivated quadrivalent vaccine (FLULAVAL) IM injection 0 5 mL, 0 5 mL, Intramuscular, Prior to discharge, Charles Franco    LORazepam (ATIVAN) 2 mg/mL injection 2 mg, 2 mg, Intramuscular, Q6H PRN, Tray Dart, PA-C    magnesium hydroxide (MILK OF MAGNESIA) 400 mg/5 mL oral suspension 30 mL, 30 mL, Oral, Daily PRN, Tray Dart, PA-C    nicotine polacrilex (NICORETTE) gum 2 mg, 2 mg, Oral, Q2H PRN, Tray Dart, PA-C    OLANZapine (ZyPREXA) IM injection 10 mg, 10 mg, Intramuscular, Q3H PRN, Tray Dart, PA-C    OLANZapine (ZyPREXA) tablet 10 mg, 10 mg, Oral, Q3H PRN, Tray Dart, PA-C    risperiDONE (RisperDAL M-TABS) dispersible tablet 1 mg, 1 mg, Oral, Q3H PRN, Tray Dart, PA-C    traZODone (DESYREL) tablet 50 mg, 50 mg, Oral, HS PRN, Tray Dart, PA-C      Social History:  Social History     History   Alcohol Use No     Comment: Denies use, recent DUI     History   Drug Use    Types: Amphetamines, Benzodiazepines, Marijuana, Cocaine     Comment: Said opiates, not specific, UDS +     History   Smoking Status    Current Every Day Smoker    Packs/day: 1 00    Years: 15 00    Types: Cigarettes   Smokeless Tobacco    Current User         Family History:  History reviewed  No pertinent family history  Review of Systems:    negative for, as above, fever, chills, night sweats, weight loss, weight gain, headaches, dizziness, fatigue and weakness    Vitals:  Vitals:    12/12/17 0725   BP: 126/75   Pulse: 71   Resp: 18   Temp: 98 2 °F (36 8 °C)   SpO2:        Physical Exam:  HEENT: Normocephalic, atraumatic, PER EOMI, nonicteric, trachea normal, thyroid normal, oropharynx normal   CARDIAC: regular rate & rhythm, S1 & S2 normal   No heaves, thrills, gallops or murmurs  LUNGS: Clear to auscultation, no spinal or CV tenderness  ABDOMEN: flat, negative hepatosplenomegaly, soft and non-tender  EXTREMITIES: No evidence of cyanosis, clubbing or edema  Lab Results: I have personally reviewed pertinent reports  See below  Imaging: I have personally reviewed pertinent reports     EKG, Pathology, and Other Studies: I have personally reviewed pertinent reports       Admission on 12/11/2017   Component Date Value    WBC 12/11/2017 8 51     RBC 12/11/2017 4 86     Hemoglobin 12/11/2017 14 2     Hematocrit 12/11/2017 40 4     MCV 12/11/2017 83     MCH 12/11/2017 29 2     MCHC 12/11/2017 35 1     RDW 12/11/2017 14 1     MPV 12/11/2017 12 0     Platelets 52/39/3527 218     Neutrophils Relative 12/11/2017 69     Lymphocytes Relative 12/11/2017 19     Monocytes Relative 12/11/2017 9     Eosinophils Relative 12/11/2017 3     Basophils Relative 12/11/2017 0     Neutrophils Absolute 12/11/2017 5 85     Lymphocytes Absolute 12/11/2017 1 59     Monocytes Absolute 12/11/2017 0 77     Eosinophils Absolute 12/11/2017 0 28     Basophils Absolute 12/11/2017 0 02     Sodium 12/11/2017 147*    Potassium 12/11/2017 3 7     Chloride 12/11/2017 107     CO2 12/11/2017 26     Anion Gap 12/11/2017 14*    BUN 12/11/2017 15     Creatinine 12/11/2017 1 07     Glucose 12/11/2017 117     Calcium 12/11/2017 9 1     AST 12/11/2017 45     ALT 12/11/2017 43     Alkaline Phosphatase 12/11/2017 107     Total Protein 12/11/2017 7 1     Albumin 12/11/2017 4 2     Total Bilirubin 12/11/2017 0 90     eGFR 12/11/2017 87     Amph/Meth UR 12/11/2017 Positive*    Barbiturate Ur 12/11/2017 Negative     Benzodiazepine Urine 12/11/2017 Positive*    Cocaine Urine 12/11/2017 Positive*    Methadone Urine 12/11/2017 Negative     Opiate Urine 12/11/2017 Negative     PCP Ur 12/11/2017 Negative     THC Urine 12/11/2017 Positive*    Ethanol Lvl 12/11/2017 <3          Impression:  Depression  Polysubstance abuse  Tobacco dependent    Plan:  Inpatient psychiatric management and treatment  What for signs of withdrawal  Nicotine patch

## 2017-12-12 NOTE — H&P
Psychiatric Evaluation - 53 [facility] 42 y o  male MRN: [id number]  Unit/Bed#: U 263-01 Encounter: [id number]    Assessment/Plan   Principal Problem:    Severe episode of recurrent major depressive disorder, without psychotic features ([address] )  Active Problems:    Generalized anxiety disorder    Cocaine abuse    Plan:   1  Check admission labs  2  Collaborate with family for baseline assessment and disposition planning  3   Continue Wellbutrin  mg daily for depression management  4   Continue Suboxone 12-3 mg SL daily-patient stabilized on this dose prior to admission  5   Hydroxyzine 50 mg q 6 hours p r n  for anxiety  I will discuss with his outpatient psychiatrist regarding option for anxiety management  6   Vitals Q 4 hourly to monitor for benzodiazepine withdrawal   I will decide accordingly if benzodiazepines are indicated  Risks, benefits and possible side effects of Medications:   Risks, benefits, and possible side effects of medications explained to patient and patient verbalizes understanding  Chief Complaint: "lot is going on in life"    History of Present Illness     Patient is a 42 y o  male presents on 61 51 81 with depression and passive death wishes  According to emergency room note done by Dr Sanchez Persons, DO:-     Depression       Pt reports to ED because his dad thinks he needs to be hospitalized, has been depressed for some time, but denies SI/HI       Pt dropped off by father for evaluation  Reportedly father concerned pt w/ worsening depression  Father not here to provide information  Pt states hx of depression w/ suicide attempt in past   On meds but doesn't feel like they are working  Doesn't have a therapist/counselor  Refuses to answer any questions regarding substance use/abuse  Pt denies any SI/HI  Denies vis/aud hallucinations    Does not want to speak to crisis       During evaluation on the unit patient initially was not cooperative with evaluation  He later became cooperative but remained with irritable edge  Patient reports that a lot is going on in his life  Patient has legal history and have housing issues at this time  Patient reports abusing Xanax and cocaine recently  Continues to minimize his underlying substance abuse and reports that it took him long time to stop Ativan and Klonopin, which was prescribed longtime ago  He remained anxious and focused on medication options  Limit setting was done and he agreed with not initiating benzodiazepine for anxiety management  Patient is on Wellbutrin for depression management by his outpatient psychiatrist   I discussed that Wellbutrin get increase the risk of anxiety, but patient is not agreeable with changing antidepressant  He continued to perseverate over "he do not like others changing his medications"  After detailed evaluation patient denies endorsing manic or psychotic symptoms  He is denying any benzo withdrawal symptoms during this evaluation  Medical Review Of Systems:  A comprehensive review of systems was negative  Psychiatric Review Of Systems:  sleep: yes  appetite changes: no  weight changes: no  energy/anergy: yes  interest/pleasure/anhedonia: yes  somatic symptoms: yes  anxiety/panic: yes  jeanmarie: no  guilty/hopeless: yes  self injurious behavior/risky behavior: no    Historical Information     Past Psychiatric History:   Multiple prior inpatient psychiatric admissions  Currently in treatment with outpatient psychiatrist   Past Suicide attempts: yes  Past Violent behavior: yes  Past Psychiatric medication trial: multiple    Substance Abuse History:  reports occasional abuse of cocaine and Xanax recently  Patient not elaborating on details and appears to be minimizing his substance abuse  I spent time with patient in counseling and education on risk of substance abuse  Assessed him motivation and encouraged patient for treatment  Brief intervention done  Social History     Tobacco History     Smoking Status  Current Every Day Smoker Smoking Frequency  1 pack/day for 15 years (15 pk yrs) Smoking Tobacco Type  Cigarettes    Smokeless Tobacco Use  Current User          Alcohol History     Alcohol Use Status  No Comment  Denies use, recent DUI          Drug Use     Drug Use Status  Yes Types  Amphetamines, Benzodiazepines, Cocaine, Marijuana Comment  Said opiates, not specific, UDS +          Sexual Activity     Sexually Active  Not Asked          Activities of Daily Living    Not Asked               Additional Substance Use Detail     Questions Responses    Alcohol Use Frequency Past abuse    Cannabis frequency Past abuse    Heroin Frequency Past abuse    Cocaine frequency Past abuse    Crack Cocaine Frequency Past abuse    Methamphetamine Frequency Past abuse    Narcotic Frequency Past abuse    Benzodiazepine Frequency Past abuse    Amphetamine frequency Past abuse    Hallucinogen frequency Past abuse    Ecstasy frequency Denies use past 12 months    Other drug frequency Denies use in past 12 months    Opiate frequency Denies use in past 12 months    Other Substance Abuse Comments (free text) says he takes an unknown amount of suboxone daily for the past 10 years    Last reviewed by Nikunj Womack RN on 12/11/2017        I am unable to assess the patient for substance use within the past 12 months as they are unable or unwilling to answer    Family Psychiatric History:   Not known    Social History:  Marital history: single  Living arrangement, social support: Support systems: homeless ? Occupational History: unemployed  Functioning Relationships: strained with spouse or significant others    Other Pertinent History: Financial and Legal: h/o incarceration in past      Traumatic History:   Abuse: not answering  Other Traumatic Events: see HPI above    Past Medical History:   Diagnosis Date    Alcohol abuse     Depression     Substance abuse Meds/Allergies   all current active meds have been reviewed  Allergies   Allergen Reactions    Nuts Anaphylaxis     [place]    Vancomycin Anaphylaxis    Venomil Honey Bee Venom [Honey Bee Venom] Anaphylaxis       Objective   Vital signs in last 24 hours:  Temp:  [96 3 °F (35 7 °C)-98 2 °F (36 8 °C)] 97 7 °F (36 5 °C)  HR:  [] 103  Resp:  [18] 18  BP: ()/(50-76) 136/76    No intake or output data in the 24 hours ending 12/12/17 1409    Mental Status Evaluation:  Appearance:  casually dressed   Behavior:  guarded   Speech:  loud   Mood:  angry; anxious; depressed   Affect:  constricted   Language: naming objects   Thought Process:  normal   Thought Content:  obsessions   Perceptual Disturbances: None   Risk Potential: Suicidal Ideations without plan, Homicidal Ideations none and Potential for Aggression No   Sensorium:  person and place   Cognition:  grossly intact   Consciousness:  awake    Attention: attention span appeared shorter than expected for age   Intellect: normal   Fund of Knowledge: awareness of current events: fair   Insight:  limited   Judgment: limited   Muscle Strength and Tone: arm(s): bilateral   Gait/Station: normal gait/station   Motor Activity: no abnormal movements     Laboratory results:    I have personally reviewed all pertinent laboratory/tests results    Admission Labs:   Admission on 12/11/2017   Component Date Value    WBC 12/11/2017 8 51     RBC 12/11/2017 4 86     Hemoglobin 12/11/2017 14 2     Hematocrit 12/11/2017 40 4     MCV 12/11/2017 83     MCH 12/11/2017 29 2     MCHC 12/11/2017 35 1     RDW 12/11/2017 14 1     MPV 12/11/2017 12 0     Platelets 92/31/9374 218     Neutrophils Relative 12/11/2017 69     Lymphocytes Relative 12/11/2017 19     Monocytes Relative 12/11/2017 9     Eosinophils Relative 12/11/2017 3     Basophils Relative 12/11/2017 0     Neutrophils Absolute 12/11/2017 5 85     Lymphocytes Absolute 12/11/2017 1 59     Monocytes Absolute 12/11/2017 0 77     Eosinophils Absolute 12/11/2017 0 28     Basophils Absolute 12/11/2017 0 02     Sodium 12/11/2017 147*    Potassium 12/11/2017 3 7     Chloride 12/11/2017 107     CO2 12/11/2017 26     Anion Gap 12/11/2017 14*    BUN 12/11/2017 15     Creatinine 12/11/2017 1 07     Glucose 12/11/2017 117     Calcium 12/11/2017 9 1     AST 12/11/2017 45     ALT 12/11/2017 43     Alkaline Phosphatase 12/11/2017 107     Total Protein 12/11/2017 7 1     Albumin 12/11/2017 4 2     Total Bilirubin 12/11/2017 0 90     eGFR 12/11/2017 87     Amph/Meth UR 12/11/2017 Positive*    Barbiturate Ur 12/11/2017 Negative     Benzodiazepine Urine 12/11/2017 Positive*    Cocaine Urine 12/11/2017 Positive*    Methadone Urine 12/11/2017 Negative     Opiate Urine 12/11/2017 Negative     PCP Ur 12/11/2017 Negative     THC Urine 12/11/2017 Positive*    Ethanol Lvl 12/11/2017 <3        Risks / Benefits of Treatment:     Risks, benefits, and possible side effects of medications explained to patient  The patient verbalizes understanding and agreement for treatment  Counseling / Coordination of Care:     Patient's presentation on admission and proposed treatment plan discussed with treatment team   Diagnosis, medication changes and treatment plan reviewed with patient  Recent stressors discussed with patient     Events leading to admission reviewed with patient  Importance of medication and treatment compliance reviewed with patient  Inpatient Psychiatric Certification:     Certification: Based upon physical, mental and social evaluations, I certify that inpatient psychiatric services are medically necessary for this patient for a duration of 5 midnights for the treatment of Severe episode of recurrent major depressive disorder, without psychotic features ([facility] [address] )    Available alternative community resources do not meet the patient's mental health care needs    I further attest that an established written individualized plan of care has been implemented and is outlined in the patient's medical records  This note has been constructed using a voice recognition system  There may be translation, syntax,  or grammatical errors  If you have any questions, please contact the dictating provider

## 2017-12-12 NOTE — TREATMENT PLAN
TREATMENT PLAN REVIEW - [address] [facility] 42 y o  1980 male MRN: [id number]    [facility] Room / Bed: [facility] [address] [address] Encounter: [id number]          Admit Date/Time:  12/11/2017  4:12 AM    Treatment Team: Attending Provider: Augusto Plaza; Registered Nurse: Morgan Campoverde, RN; Registered Nurse: Homero Mcadams, RN; Occupational Therapy Assistant: MAGALYS Tavarez; Patient Care Assistant: Kamlesh Nguyen; Patient Care Technician: Adelita Sage; : Tristen Smith    Diagnosis: Principal Problem:    Severe episode of recurrent major depressive disorder, without psychotic features ([facility] )  Active Problems:    Generalized anxiety disorder    Cocaine abuse    Patient Strengths/Assets: cooperative, good past treatment response, patient is on a voluntary commitment    Patient Barriers/Limitations: homeless, low self esteem, marital/family conflict, substance abuse    Short Term Goals: decrease in depressive symptoms, decrease in anxiety symptoms, decrease in suicidal thoughts, decrease in self abusive behaviors, decrease in homicidal thoughts, decrease in level of agitation    Long Term Goals: improvement in depression, improvement in anxiety, resolution of depressive symptoms, stabilization of mood, free of suicidal thoughts, no self abusive behavior, acceptance of need for psychiatric medications, acceptance of need for psychiatric treatment, acceptance of need for psychiatric follow up after discharge    Progress Towards Goals: starting psychiatric medications as prescribed    Recommended Treatment: medication management, patient medication education, group therapy, milieu therapy, continued Behavioral Health psychiatric evaluation/assessment process    Treatment Frequency: daily medication monitoring, group and milieu therapy daily, monitoring through interdisciplinary rounds, monitoring through weekly patient care conferences    Expected Discharge Date: 5-7 days    Discharge Plan: referral for outpatient medication management with a psychiatrist, referral for outpatient psychotherapy    Treatment Plan Created/Updated By: Tania Valentin

## 2017-12-12 NOTE — CASE MANAGEMENT
CM and Pt reviewed and signed Tx Plan  CM and Pt reviewed and signed ROIs for [facility] Ning(father)*limited, Dr Dipti Cartwright(psychiatrist); Pt reported Dr  Isra Benitez is listed as a PCP, however, he has never been seen by him  Pt is a 42year old male who reported he was unclear of all the events that led to his admission  He reported he had been living with his girlfriend in [place] for about 6 months  Pt reported he had gotten in a fight with his girlfriend, the night before, and left  He was arrested for a DUI and his truck impounded, and him taken to the police station  Pt reported the police then said they would release him & they called her girlfriend, who had said he could be brought back to the home  Pt said that by the time they drove him there, his girlfriend must have called his father, who told him not to let him back if he was using drugs  Pt said the  talked to his girlfriend, and then drove him to [facility], & was trying to 302 him, however, was unsuccessful  Pt said that his girlfriend had driven to the hospital separately & waited in the waiting room, but once she learned he wasn't being committed, she left  Pt reported he called his dad who drove to [place] to pick him up & drop him off in [place] at a friend's house  Pt said that he & his dad agreed to meet again the next day, and did so & his dad gave him a cell phone  Pt said that he eventually called his dad & said he would agree to go to the hospital   Pt said that he has been at [facility] in 2014, and has been several other hospitals which were not pleasant, so he asked his dad to drive him here  Pt said, "I must have fallen asleep while he was driving, and changed my mind during that time, because I woke up here & didn't want treatment"  Pt said he doesn't know if Pt's dad or hospital staff called the police, but they came & made him get out of the car    Pt said he came in & did the evaluation & they said he didn't need to be admitted  Pt said that he left the hospital & was walking down the street when "the totality of my circumstances sunk in, and I realized I needed help"  Pt said that he tried to come back to the ER, however, the ER manager was refusing for him to be seen  Pt said that the police were called, & he was evaluated & admitted  Pt reported he is  and has 3 daughters that live in [place] with there mother  Pt said that his parents are , however, his mom will only have contact with him if he is doing well  Pt said that his dad is somewhat supportive  Pt has one sister & one brother  Pt said that his father has a history of depression & Pt's brother also struggles with depression & he believes he "pops pills" too  Pt reported he has been hospitalized several times since his early 17's, and has been to inpatient rehabs several times too  Pt reported one prior suicide attempt in 2014 when he cut his forearms from wrist to elbow  Pt reported he was taken to [facility] to [facility], where he had surgery  Pt reported after surgery he was transferred to [facility]  Pt reported his psychiatrist is Dr Sully Gallagher in [place], [place]  Pt reported he was seeing him monthly for a period of time, but then was incarcerated for a year, and had been back to see him once & was scheduled to see him a second time soon; he is not who is prescribing Pt's suboxone  Pt reported they had discussed him starting to see a therapist there as well, but nothing had been scheduled yet  Pt reported Dr Monroe Never is listed as his PCP, but he has never actually been seen with him  Pt reported he didn't know if he had any medical conditions, as he hasn't seen a doctor in years  Pt denied any history of seizures  Pt reported to be a polysubstance abuser  Pt said that he didn't really have a drug of choice    Pt reported that he had used daily, for years, however, in the past 6 years, he has mostly used when really depressed/stressed & will go on binges & then have periods of clean time  Pt reported he has not used any IV drugs in several years  Pt said that his longest period of sobriety was 2 years, when he was working, in therapy, and going to [facility]  Pt reported he found 12 step meetings don't work for him  CM asked Pt about inpatient rehab, and he reported he didn't know what it would do to help him, as he has gone several times  Pt reported there is no new material out there, and rehabs is usually just a temporary housing option for him  CM asked about a recovery house & Pt said that no recovery houses would take him on suboxone  Pt denied spirituality playing a role in his life  Pt reported he has his GED  Pt said that he was working at a [employer] in [facility], however, doesn't think he still has the job & if he cannot return to his girlfriends, he would not be able to commute that far  Pt reported he is unsure if he has any legal charges from the DUI or the incidents with the police in [facility]  Pt denied having access to firearms, and then said if he didn't he certainly wouldn't tell CM and that it was a "dumb question"  When asked about his goal & what he hoped the treatment team could help him with during this admission, he reported he was unsure & didn't have an answer  Pt focused on his truck, which is impounded & how his father can go & get it for him  CM contacted Pt's father, Eliana Gonzalez, @ [phone] to review what needed to be done for Pt's vehicle, which is currently impounded  Eliana Gonzalez said that Pt needs to give him limited POA to be able to go there & get the truck  Eliana Gonzalez said that he found a sample template & printed it  CM asked that email or fax it to her & she would see if the hospital notary was available   Eliana Gonzalez reported he knew that CM could not share information with her, but he wanted to let the hospital staff know that if Pt did not go to some type of treatment facility, he would use again  SANTHOSH reviewed that all patients have the right to chose or refuse treatment, and she would just be there to support Pt & provide him resources/options  Pollo verbalized understanding, and said that this is the last time he can rescue or bail Pt out  CM encourage Pollo to have those conversations with Pt, and he said he would talk to him later  CM received the document via fax & contacted Jay Coyne, who came to the unit  The document needed a notary section added, which she was able to do, & then met with Pt  CM assist Pt with getting his photo ID from his locker, and he provided this & signed the document  SANTHOSH contacted Akil Donaldson @ [phone] & inquired if the POA notarized form could be faxed, and was told no, they would need the original document  SANTHOSH contacted Pt's father, Lesa Woods, @ [phone] to relay this information, and he agreed he would come to the hospital to pick it up  He expressed concern that Pt's truck was equipped with ETOH testing & if Pt tried to use it, it may have locked the vehicle & he may not be able to drive it anyway  Lesa Woods said that he planned to get all of Pt's belongings & then he would decide what else he should do  CM reported she would leave the letter at the nurses station for him to   CM placed letter in envelope & gave to MHT at the nurses station  SANTHOSH contacted Pt's psychiatrist, Dr Kings Escalante, @ [phone], and reached a recording that no messages could be left & to contact the answering service @ [phone]  CM called & spoke with Radha Tejada, who took down a message for Dr Onnie Halsted to contact the attending physician directly regarding patient

## 2017-12-12 NOTE — PROGRESS NOTES
Mild irritability noted at times but cooperative during interaction with this writer  Inquired if he will meet with MD again and states his suboxone is usually 12mg not 8mg which is how it is currently ordered, Dr Miladis Gonzalez made aware  When asked why he was irritable on the phone earlier he stated he was speaking with ex-girlfriend  Increased anxiety and received PRN atarax

## 2017-12-13 LAB
CHOLEST SERPL-MCNC: 114 MG/DL (ref 50–200)
EST. AVERAGE GLUCOSE BLD GHB EST-MCNC: 111 MG/DL
HBA1C MFR BLD: 5.5 % (ref 4.2–6.3)
HDLC SERPL-MCNC: 36 MG/DL (ref 40–60)
LDLC SERPL CALC-MCNC: 67 MG/DL (ref 0–100)
TRIGL SERPL-MCNC: 57 MG/DL
TSH SERPL DL<=0.05 MIU/L-ACNC: 1.01 UIU/ML (ref 0.36–3.74)

## 2017-12-13 PROCEDURE — 80061 LIPID PANEL: CPT | Performed by: PSYCHIATRY & NEUROLOGY

## 2017-12-13 PROCEDURE — 86592 SYPHILIS TEST NON-TREP QUAL: CPT | Performed by: PSYCHIATRY & NEUROLOGY

## 2017-12-13 PROCEDURE — 84443 ASSAY THYROID STIM HORMONE: CPT | Performed by: PSYCHIATRY & NEUROLOGY

## 2017-12-13 PROCEDURE — 83036 HEMOGLOBIN GLYCOSYLATED A1C: CPT | Performed by: PSYCHIATRY & NEUROLOGY

## 2017-12-13 RX ORDER — ZOLPIDEM TARTRATE 5 MG/1
10 TABLET ORAL
Status: DISCONTINUED | OUTPATIENT
Start: 2017-12-13 | End: 2017-12-14

## 2017-12-13 RX ADMIN — ZOLPIDEM TARTRATE 10 MG: 5 TABLET, COATED ORAL at 21:23

## 2017-12-13 RX ADMIN — BUPRENORPHINE HYDROCHLORIDE, NALOXONE HYDROCHLORIDE 2 FILM: 2; .5 FILM, SOLUBLE BUCCAL; SUBLINGUAL at 09:44

## 2017-12-13 RX ADMIN — BUPROPION HYDROCHLORIDE 300 MG: 300 TABLET, FILM COATED, EXTENDED RELEASE ORAL at 09:43

## 2017-12-13 RX ADMIN — BUPRENORPHINE HYDROCHLORIDE, NALOXONE HYDROCHLORIDE 1 FILM: 8; 2 FILM, SOLUBLE BUCCAL; SUBLINGUAL at 09:44

## 2017-12-13 RX ADMIN — NICOTINE 1 PATCH: 21 PATCH, EXTENDED RELEASE TRANSDERMAL at 09:44

## 2017-12-13 NOTE — PLAN OF CARE
Problem: DISCHARGE PLANNING  Goal: Discharge to home or other facility with appropriate resources  INTERVENTIONS:  - Identify barriers to discharge w/patient and caregiver  - Arrange for needed discharge resources and transportation as appropriate  - Identify discharge learning needs (meds, wound care, etc )  - Arrange for interpretive services to assist at discharge as needed  - Refer to Case Management Department for coordinating discharge planning if the patient needs post-hospital services based on physician/advanced practitioner order or complex needs related to functional status, cognitive ability, or social support system   Outcome: Progressing  Pt is considering outpatient therapy or substance abuse tx, but first needs to determine where he will be staying

## 2017-12-13 NOTE — PROGRESS NOTES
Progress Note - [facility] Ning 42 y o  male MRN: [id number]  Unit/Bed#: [facility] 263-01 Encounter: [id number]    Assessment/Plan   Principal Problem:    Severe episode of recurrent major depressive disorder, without psychotic features ([address] )  Active Problems:    Generalized anxiety disorder    Cocaine abuse      Subjective:  Patient scant and irritable during conversation  Answering in mostly 1 word answers or short sentences  Not willing to change anxiolytics or antidepressants  Reports Hydroxyzine as ineffective  Not willing to try SSRI, Gabapentin, Buspar  Not candidate for benzodiazepine and does not show signs of benzodiazepine withdrawal   Not interested in rehab services  Reports chronic depression with decreased passive death wishes  Contracts for safety  Anxiety rated as manageable  Denied psychosis and does not endorse criteria for jeanmarie  Compliant with scheduled medications  No side effects noted  Seen loud and yelling over phone calls earlier in afternoon        Current Medications:  Current Facility-Administered Medications   Medication Dose Route Frequency    acetaminophen (TYLENOL) tablet 650 mg  650 mg Oral Q6H PRN    aluminum-magnesium hydroxide-simethicone (MYLANTA) 200-200-20 mg/5 mL oral suspension 30 mL  30 mL Oral Q4H PRN    benztropine (COGENTIN) injection 1 mg  1 mg Intramuscular Q6H PRN    benztropine (COGENTIN) tablet 1 mg  1 mg Oral Q6H PRN    buprenorphine-naloxone (SUBOXONE) 2-0 5 mg per SL film 2 Film  2 Film Sublingual Daily    buprenorphine-naloxone (SUBOXONE) 8-2 mg per SL film 1 Film  1 Film Sublingual Daily    buPROPion (WELLBUTRIN XL) 24 hr tablet 300 mg  300 mg Oral Daily    haloperidol (HALDOL) tablet 5 mg  5 mg Oral Q6H PRN    haloperidol lactate (HALDOL) injection 5 mg  5 mg Intramuscular Q6H PRN    hydrOXYzine HCL (ATARAX) tablet 50 mg  50 mg Oral Q6H PRN    influenza inactivated quadrivalent vaccine (FLULAVAL) IM injection 0 5 mL  0 5 mL Intramuscular Prior to discharge    LORazepam (ATIVAN) 2 mg/mL injection 2 mg  2 mg Intramuscular Q6H PRN    magnesium hydroxide (MILK OF MAGNESIA) 400 mg/5 mL oral suspension 30 mL  30 mL Oral Daily PRN    nicotine (NICODERM CQ) 21 mg/24 hr TD 24 hr patch 1 patch  1 patch Transdermal Daily    nicotine polacrilex (NICORETTE) gum 2 mg  2 mg Oral Q2H PRN    OLANZapine (ZyPREXA) IM injection 10 mg  10 mg Intramuscular Q3H PRN    OLANZapine (ZyPREXA) tablet 10 mg  10 mg Oral Q3H PRN    risperiDONE (RisperDAL M-TABS) dispersible tablet 1 mg  1 mg Oral Q3H PRN    traZODone (DESYREL) tablet 50 mg  50 mg Oral HS PRN    zolpidem (AMBIEN) tablet 5 mg  5 mg Oral HS       Behavioral Health Medications: all current active meds have been reviewed and continue current psychiatric medications  Vitals:  Vitals:    12/13/17 1226   BP: 140/87   Pulse: 92   Resp: 19   Temp:    SpO2:        Laboratory results:    I have personally reviewed all pertinent laboratory/tests results    Most Recent Labs:   Lab Results   Component Value Date    WBC 8 51 12/11/2017    RBC 4 86 12/11/2017    HGB 14 2 12/11/2017    HCT 40 4 12/11/2017     12/11/2017    RDW 14 1 12/11/2017    NEUTROABS 5 85 12/11/2017     (H) 12/11/2017    K 3 7 12/11/2017     12/11/2017    CO2 26 12/11/2017    BUN 15 12/11/2017    CREATININE 1 07 12/11/2017    GLUCOSE 117 12/11/2017    CALCIUM 9 1 12/11/2017    AST 45 12/11/2017    ALT 43 12/11/2017    ALKPHOS 107 12/11/2017    PROT 7 1 12/11/2017    ALBUMIN 4 2 12/11/2017    BILITOT 0 90 12/11/2017    CHOL 114 12/13/2017    HDL 36 (L) 12/13/2017    TRIG 57 12/13/2017    LDLCALC 67 12/13/2017    IEX0KSPNDVNS 1 014 12/13/2017    RPR Non-Reactive (q 02/05/2015       Psychiatric Review of Systems:  Behavior over the last 24 hours:  unchanged  Sleep: insomnia  Appetite: poor  Medication side effects: No  ROS: no complaints    Mental Status Evaluation:  Appearance:  casually dressed   Behavior:  guarded and seclusive   Speech:  soft   Mood:  angry, depressed, anxious, and irritable   Affect:  constricted and mood-congruent   Language sparse   Thought Process:  goal directed and linear   Thought Content:  normal  Denied delusions/obsessions   Perceptual Disturbances: None   Risk Potential: Reduction of passive death wishes  Denied HI  Potential for aggression: No   Sensorium:  person, place and time/date   Cognition:  grossly intact   Consciousness:  alert and awake    Attention: attention span appeared shorter than expected for age   Insight:  poor   Judgment: poor   Gait/Station: normal gait/station and normal balance   Motor Activity: no abnormal movements     Progress Toward Goals: unchanged    Recommended Treatment: Continue with group therapy, milieu therapy and occupational therapy  1   Increase Ambien to 10mg HS for reported poor sleep  2  Continue other medications  3  Attending psychiatrist to contact patient's outpatient psychiatrist  4  Disposition planning    Risks, benefits and possible side effects of Medications:   Risks, benefits, and possible side effects of medications explained to patient and patient verbalizes understanding        Silke Long PA-C

## 2017-12-13 NOTE — CASE MANAGEMENT
SANTHOSH contacted Pt's psychiatrist, Dr Jacky Fields @ [phone] & he reported he is going to be away for the holiday, but can see Pt on 1/4 at 3:30 PM   SANTHOSH asked if he would be available to speak to the attending psychiatrist,  & Dr Jacky Fields initially said yes, but then when told it would be about 10 minutes, he said he would not be available at the time or the rest of the day  SANTHOSH provided Dr Jacky Fields with the attending physician's direct phone number, and he agreed that he would call  CM confirmed fax number [phone]  SANTHOSH asked about Pt seeing a therapist, and Dr Jacky Fields said that SANTHOSH could set Pt up with one, however, he did not offer therapy at his office

## 2017-12-13 NOTE — PROGRESS NOTES
Seclusive to room all morning  OOB for lunch and on phone for lengthy period of time and was irritable on the phone with whom ever he was speaking with at that time

## 2017-12-13 NOTE — PROGRESS NOTES
Pt remains labile this evening  Was agitated on the phone, however after phone call pt was calm and appropriate while speaking to this writer  States upset due to continued hospitalization  Reports having to get car after discharge and concerned with this  Pt denies SI/HI, moderate depression and elevated anxiety  States "I'm better than when I first got here so I think I am ready to just go home " pt denies any concerns or questions aside from wanting to know discharge date  Pt states he will speak to the Doctor about this tomorrow  Reports sleeping well overnight  Pt reading book in room at times  Will continue to monitor

## 2017-12-13 NOTE — PROGRESS NOTES
Seclusive to room, lying in bed covering eyes with pillow  Scant conversation  Denies SI/HI  Elevated depression and received AM medications attempting to use coping skills prior to requesting PRN medication  Specimen cup at bedside and patient reminded of need for UA

## 2017-12-13 NOTE — CASE MANAGEMENT
CM met with Pt, who reported that his dad was going to go down today to get his belongings out of Pt's truck, however, he is not planning on getting the truck out of impound  Pt is anxious about how long he will remain in the hospital, due to his truck accruing a $50/day storage fee  CM reviewed d/c is tentative for the beginning of next week & reviewed that the treatment team looks for Pt to be attending to ADLs, participating in groups, & demonstrating overall improvement since admission  Pt verbalized understanding  CM reported she was able to talk with Dr Deena Steward, who gave Pt an appointment for 1/4; Pt agreeable  CM asked Pt about referrals for outpatient substance abuse tx or individual therapy  Pt is interested, however, reported he is unsure where he will be staying  Pt said he is talking with his girlfriend & they are trying to work out what would be best   CM provided Pt with a printout of [employer] outpatient mental health providers & outpatient substance abuse treatment providers  Pt agreed to review the options, and discuss further with CM tomorrow

## 2017-12-13 NOTE — PROGRESS NOTES
Pt seclusive to room throughout most of the shift  Pt attended group this afternoon with some participation  Pt deneis w/d symptoms, scant in conversation  No questions or concerns offered, nurse will continue to monitor

## 2017-12-13 NOTE — DISCHARGE INSTR - OTHER ORDERS
Crisis Services Crisis is not simply the moment when things become intolerable  Crises build over time, and often can be recognized and managed in advance  [address] provides not only immediate support for crisis situations, but also assistance with managing recurring or future crises  Support is available 24 hours a day, 7 days a week at [phone] ([phone])  This service is available to anyone in [facility], including children, teens, adults, and families  Stages of Crisis Management  Before a crisis  When you start to recognize the stressors that you or a loved one have felt during previous crises, please call [facility] peer support talk line at [phone]  It is available, free of charge, 7 days a week, 1:00pm to 9:00pm   [facility] also has a [employer] that can be reached by calling [phone] or texting [phone]  It is available Monday through Friday, 3:00pm to 9:00pm     During a crisis  When you or a loved one are experiencing a crisis, Mobile Crisis is available to help  Just call [phone]  The line is open 24 hours per day, 7 days per week  After a crisis  Mobile Crisis would like to help you develop ways to help reduce future crisis situations and create a crisis plan as part of your (or your child's, or your family's) recovery and wellness goals  Crisis Text Line is free, 24/7 support for those in crisis  Text Home to [phone] from anywhere in the [place] to text with a trained Crisis Counselor  [place] [facility] ALCOHOL TREATMENT  Treatment services are determined by the unique needs of each person  Initial contacts may be made through any of the Outpatient Treatment Facilities or Case Management Offices  Outpatient treatment is for persons with patterns of abuse or addiction who are seeking help   Individual,  group and family counseling is available through regularly  scheduled hourly appointments  [address] [facility]  [address] [facility], [address] [facility] [phone]  [facility] [place], [address] [facility], [address] [facility] [phone]  Hay Jerry [address] [facility]   [address] [facility]  [place], [address] [facility] [phone]  [facility]  [address]  [facility], [address] [place] [address] [phone]  [facility] [place]  [address] [facility], [address] [facility] [address] [phone]    The [address] [address] Alcohol  Information and Advocacy Service  [phone] [address]  residents experiencing drug and  alcohol problems, or seeking help for a  family member, friend or colleague

## 2017-12-14 LAB — RPR SER QL: NORMAL

## 2017-12-14 RX ORDER — BUPROPION HYDROCHLORIDE 300 MG/1
300 TABLET ORAL DAILY
Qty: 30 TABLET | Refills: 1 | Status: SHIPPED | OUTPATIENT
Start: 2017-12-15

## 2017-12-14 RX ORDER — HYDROXYZINE 50 MG/1
50 TABLET, FILM COATED ORAL EVERY 6 HOURS PRN
Qty: 30 TABLET | Refills: 1 | Status: SHIPPED | OUTPATIENT
Start: 2017-12-14

## 2017-12-14 RX ORDER — BUPRENORPHINE AND NALOXONE 2; .5 MG/1; MG/1
2 FILM, SOLUBLE BUCCAL; SUBLINGUAL DAILY
Qty: 20 FILM | Refills: 0
Start: 2017-12-15

## 2017-12-14 RX ORDER — TRAZODONE HYDROCHLORIDE 100 MG/1
100 TABLET ORAL
Qty: 30 TABLET | Refills: 1 | Status: SHIPPED | OUTPATIENT
Start: 2017-12-14

## 2017-12-14 RX ORDER — TRAZODONE HYDROCHLORIDE 100 MG/1
100 TABLET ORAL
Status: DISCONTINUED | OUTPATIENT
Start: 2017-12-14 | End: 2017-12-15 | Stop reason: HOSPADM

## 2017-12-14 RX ORDER — BUPRENORPHINE HYDROCHLORIDE AND NALOXONE HYDROCHLORIDE DIHYDRATE 8; 2 MG/1; MG/1
1 TABLET SUBLINGUAL DAILY
Qty: 10 TABLET | Refills: 0
Start: 2017-12-14

## 2017-12-14 RX ADMIN — BUPROPION HYDROCHLORIDE 300 MG: 300 TABLET, FILM COATED, EXTENDED RELEASE ORAL at 08:30

## 2017-12-14 RX ADMIN — BUPRENORPHINE HYDROCHLORIDE, NALOXONE HYDROCHLORIDE 2 FILM: 2; .5 FILM, SOLUBLE BUCCAL; SUBLINGUAL at 08:30

## 2017-12-14 RX ADMIN — BUPRENORPHINE HYDROCHLORIDE, NALOXONE HYDROCHLORIDE 1 FILM: 8; 2 FILM, SOLUBLE BUCCAL; SUBLINGUAL at 08:30

## 2017-12-14 RX ADMIN — TRAZODONE HYDROCHLORIDE 100 MG: 100 TABLET ORAL at 21:23

## 2017-12-14 RX ADMIN — NICOTINE 1 PATCH: 21 PATCH, EXTENDED RELEASE TRANSDERMAL at 08:33

## 2017-12-14 NOTE — PROGRESS NOTES
Progress Note - 53 [facility] Ning 42 y o  male MRN: [id number]  Unit/Bed#: U 263-01 Encounter: [id number]    Assessment/Plan   Principal Problem:    Severe episode of recurrent major depressive disorder, without psychotic features ([address] )  Active Problems:    Generalized anxiety disorder    Cocaine abuse      Subjective:  Patient seen out of room today with brighter affect  Not irritable and was smiling during conversation  Still was not willing to try an additional SSRI or anxiolytic for increased anxiety symptoms  Reports feeling more stable with better idea of what he needs to do when he leaves hospital   Agrees for no benzodiazepines  For sleep it remains poor despite increased dosing in Ambien  Not willing to try Seroquel or Remeron  Agrees to take Trazodone 100mg HS  Had prior good results with medication  Reports no SI today, more optimistic, and more motivated  Denied psychosis  Does not show signs of jeanmarie  Medication compliant  Appears to be tolerating medications well without serious side effects      Current Medications:  Current Facility-Administered Medications   Medication Dose Route Frequency    acetaminophen (TYLENOL) tablet 650 mg  650 mg Oral Q6H PRN    aluminum-magnesium hydroxide-simethicone (MYLANTA) 200-200-20 mg/5 mL oral suspension 30 mL  30 mL Oral Q4H PRN    benztropine (COGENTIN) injection 1 mg  1 mg Intramuscular Q6H PRN    benztropine (COGENTIN) tablet 1 mg  1 mg Oral Q6H PRN    buprenorphine-naloxone (SUBOXONE) 2-0 5 mg per SL film 2 Film  2 Film Sublingual Daily    buprenorphine-naloxone (SUBOXONE) 8-2 mg per SL film 1 Film  1 Film Sublingual Daily    buPROPion (WELLBUTRIN XL) 24 hr tablet 300 mg  300 mg Oral Daily    haloperidol (HALDOL) tablet 5 mg  5 mg Oral Q6H PRN    haloperidol lactate (HALDOL) injection 5 mg  5 mg Intramuscular Q6H PRN    hydrOXYzine HCL (ATARAX) tablet 50 mg  50 mg Oral Q6H PRN    influenza inactivated quadrivalent vaccine (FLULAVAL) IM injection 0 5 mL  0 5 mL Intramuscular Prior to discharge    LORazepam (ATIVAN) 2 mg/mL injection 2 mg  2 mg Intramuscular Q6H PRN    magnesium hydroxide (MILK OF MAGNESIA) 400 mg/5 mL oral suspension 30 mL  30 mL Oral Daily PRN    nicotine (NICODERM CQ) 21 mg/24 hr TD 24 hr patch 1 patch  1 patch Transdermal Daily    nicotine polacrilex (NICORETTE) gum 2 mg  2 mg Oral Q2H PRN    OLANZapine (ZyPREXA) IM injection 10 mg  10 mg Intramuscular Q3H PRN    OLANZapine (ZyPREXA) tablet 10 mg  10 mg Oral Q3H PRN    risperiDONE (RisperDAL M-TABS) dispersible tablet 1 mg  1 mg Oral Q3H PRN    traZODone (DESYREL) tablet 100 mg  100 mg Oral HS    traZODone (DESYREL) tablet 50 mg  50 mg Oral HS PRN       Behavioral Health Medications: all current active meds have been reviewed and continue current psychiatric medications  Vitals:  Vitals:    12/14/17 1101   BP: 135/77   Pulse: 75   Resp: 20   Temp: (!) 97 1 °F (36 2 °C)   SpO2:        Laboratory results:    I have personally reviewed all pertinent laboratory/tests results    Most Recent Labs:   Lab Results   Component Value Date    WBC 8 51 12/11/2017    RBC 4 86 12/11/2017    HGB 14 2 12/11/2017    HCT 40 4 12/11/2017     12/11/2017    RDW 14 1 12/11/2017    NEUTROABS 5 85 12/11/2017     (H) 12/11/2017    K 3 7 12/11/2017     12/11/2017    CO2 26 12/11/2017    BUN 15 12/11/2017    CREATININE 1 07 12/11/2017    GLUCOSE 117 12/11/2017    CALCIUM 9 1 12/11/2017    AST 45 12/11/2017    ALT 43 12/11/2017    ALKPHOS 107 12/11/2017    PROT 7 1 12/11/2017    ALBUMIN 4 2 12/11/2017    BILITOT 0 90 12/11/2017    CHOL 114 12/13/2017    HDL 36 (L) 12/13/2017    TRIG 57 12/13/2017    LDLCALC 67 12/13/2017    QSN5YXDFZSRF 1 014 12/13/2017    RPR Non-Reactive 12/13/2017       Psychiatric Review of Systems:  Behavior over the last 24 hours:  improved  Sleep: poor  Appetite: normal  Medication side effects: No  ROS: no complaints    Mental Status Evaluation:  Appearance:  casually dressed   Behavior:  less guarded, cooperative   Speech:  normal pitch and normal volume   Mood:  less depressed and anxious   Affect:  mood-congruent, brighter   Language appropriate   Thought Process:  normal   Thought Content:  normal  Denied delusions/obsessions   Perceptual Disturbances: None   Risk Potential: Denied SI/HI  Potential for aggression: No   Sensorium:  person, place and time/date   Cognition:  grossly intact   Consciousness:  alert and awake    Attention: attention span appeared shorter than expected for age   Insight:  partial   Judgment: partial   Gait/Station: normal gait/station and normal balance   Motor Activity: no abnormal movements     Progress Toward Goals: unchanged    Recommended Treatment: Continue with group therapy, milieu therapy and occupational therapy  1   D/C Ambien  Add Trazodone 100mg HS for insomnia  2  Continue other medications  3  Disposition planning with tentative discharge tomorrow    Risks, benefits and possible side effects of Medications:   Risks, benefits, and possible side effects of medications explained to patient and patient verbalizes understanding        Lico Rascon PA-C

## 2017-12-14 NOTE — DISCHARGE INSTRUCTIONS
Depression   WHAT YOU NEED TO KNOW:   Depression is a medical condition that causes feelings of sadness or hopelessness that do not go away  Depression may cause you to lose interest in things you used to enjoy  These feelings may interfere with your daily life  DISCHARGE INSTRUCTIONS:   Call 911 for any of the following:   · You think about harming yourself or someone else  Contact your healthcare provider if:   · Your symptoms do not improve  · You cannot make it to your next appointment  · You have new symptoms  · You have questions or concerns about your condition or care  Medicines:   · Antidepressants  may be given to improve or balance your mood  You may need to take this medicine for several weeks before you begin to feel better  · Take your medicine as directed  Contact your healthcare provider if you think your medicine is not helping or if you have side effects  Tell him of her if you are allergic to any medicine  Keep a list of the medicines, vitamins, and herbs you take  Include the amounts, and when and why you take them  Bring the list or the pill bottles to follow-up visits  Carry your medicine list with you in case of an emergency  Therapy  may be used to treat your depression  A therapist will help you learn to cope with your thoughts and feelings  This can be done alone or in a group  It may also be done with family members or a significant other  Self-care:   · Get regular physical activity  Try to exercise for 30 minutes, 3 to 5 days a week  Work with your healthcare provider to develop an exercise plan that you enjoy  Physical activity may improve your symptoms  · Get enough sleep  Create a routine to help you relax before bed  You can listen to music, read, or do yoga  Try to go to bed and wake up at the same time every day  Sleep is important for emotional health  · Eat a variety of healthy foods from all of the food groups    A healthy meal plan is low in fat, salt, and added sugar  Ask your healthcare provider for more information about a meal plan that is right for you  · Do not drink alcohol or use drugs  Alcohol and drugs can make your symptoms worse  Follow up with your healthcare provider as directed: Your healthcare provider will monitor your progress at follow-up visits  He or she will also monitor your medicine if you take antidepressants  Your healthcare provider will ask if the medicine is helping  Tell him or her about any side effects or problems you may have with your medicine  The type or amount of medicine may need to be changed  Write down your questions so you remember to ask them during your visits  © 2017 [address] [employer] Information is for End User's use only and may not be sold, redistributed or otherwise used for commercial purposes  All illustrations and images included in CareNotes® are the copyrighted property of A D A M , Inc  or Alistair Neff  The above information is an  only  It is not intended as medical advice for individual conditions or treatments  Talk to your doctor, nurse or pharmacist before following any medical regimen to see if it is safe and effective for you  Polysubstance Abuse   WHAT YOU NEED TO KNOW:   Polysubstance abuse is the abuse of 2 or more drugs that cause impairment or distress  Examples include alcohol, nicotine, marijuana, cocaine, heroin, methamphetamine, hallucinogens such as mushrooms, or inhalants such as paint thinner  Prescribed medicines, such as opioids for pain or benzodiazepines for anxiety, are also commonly abused  DISCHARGE INSTRUCTIONS:   Call 911 for any of the following:   · You feel you might harm yourself or others  Return to the emergency department if:   · You have a seizure  · You have chest pain and your heart is beating faster than usual      · You have new shortness of breath  · You are dizzy and lightheaded    Contact your healthcare provider or therapist if:   · You are using drugs and think you are pregnant  · You have withdrawal symptoms and want to start using drugs again  · You have questions or concerns about your condition or care  Risks of polysubstance abuse:   · Drug dependence  is when you continue to use drugs, even when you know the risks  Polysubstance abuse can damage your heart, brain, lungs, liver, and gastrointestinal tract  You continue even when it causes problems with work, school, or relationships  You may have difficulty finding or keeping a job because of your drug dependence  · Drug tolerance  is when you need to use more drugs, or use them more often, to get the effects you want  You may not be able to stop using the drugs  When you try to stop, you may have withdrawal symptoms and strong cravings for the drugs  · Drug overdose  can occur when you take more drugs than your body can handle  This may be a small amount or a large amount  You can lose consciousness or have a seizure or stroke  Your heart can stop beating, or you can stop breathing  You may die from a drug overdose  Medicines:   · Withdrawal medicines  may be given according to the types of drugs you are abusing  Withdrawal from drugs can cause serious, life-threatening side effects  Certain medicines can help decrease your withdrawal symptoms and your desire for the drug  Ask for more information about the withdrawal medicines you may need  · Mood stabilizers  may be given to help prevent or treat depression or anxiety caused by drug abuse and withdrawal      · Take your medicine as directed  Contact your healthcare provider if you think your medicine is not helping or if you have side effects  Tell him or her if you are allergic to any medicine  Keep a list of the medicines, vitamins, and herbs you take  Include the amounts, and when and why you take them  Bring the list or the pill bottles to follow-up visits   Carry your medicine list with you in case of an emergency  Follow up with your healthcare provider as directed: You may be referred to a specialist to treat health conditions caused by your drug use  This includes mental health, heart, or lung specialists  Write down your questions so you remember to ask them during your visits  Therapy:  You may need therapy and support to stop using drugs:  · Cognitive and behavioral therapy  helps you change your thinking and behavior  It can help you develop plans to avoid the situations that make you want to use drugs  It also helps you cope with the feelings of wanting to use drugs  You may have individual or group therapy  · Contingency management  helps you set drug-free goals with a therapist  Benson Payne will decide ways to celebrate your success when you reach a goal      · Family therapy and support groups  allow you and your family members to talk to and be encouraged by other people affected by drug abuse  You and your family members may attend together or separately  Ask your healthcare provider for information about programs in your area  How polysubstance abuse affects unborn or  babies:   · If you are pregnant or get pregnant while using drugs, you may have a miscarriage or give birth early  Your baby may be born addicted to the drugs  · Do not breastfeed your baby if you use drugs  Drugs pass from your bloodstream into your breast milk and affect your baby's health  Talk with your healthcare provider if you are using drugs and breastfeeding  For support and more information:   · Alcoholics Anonymous  Web Address: http://www rich info/  · [place] on Drug and Alcohol Information  Phone: 8- 434 - 2183858  Web Address: [employer]  · [employer] on Alcoholism and Drug Dependence  Lilian Tripathi [address] [address]  Phone: 5- 768 - 357-8991  Phone: 3- 404 - 642-1338  Web Address: [employer]  org  © 2017 [employer] [address] [facility] is for End User's use only and may not be sold, redistributed or otherwise used for commercial purposes  All illustrations and images included in CareNotes® are the copyrighted property of A D A M , Inc  or Alistair Neff  The above information is an  only  It is not intended as medical advice for individual conditions or treatments  Talk to your doctor, nurse or pharmacist before following any medical regimen to see if it is safe and effective for you

## 2017-12-14 NOTE — CASE MANAGEMENT
CM met with Pt, who said that he decided he would like a referral for a therapist   Pt said that he thinks his dad may be able to pick him up tomorrow, but he has to confirm this  Pt said that he plans to go to [place], where a friend will lend him money, so he can get his truck out  Pt said that he will probably stay with his girlfriend for a short period of time, and try to figure things out  Pt said that he didn't have a preference for who he saw, just somewhere in the [place] area  CM contacted Tonja Baum, PhD, [address] [facility], [address], [facility], [address] [facility], [phone] & left a message seeking to refer a new patient  CM contacted Jane Rivas, PhD [address] [phone], but reached a busy signal   CM contacted Cindy Venegas [facility], [address], [address] [facility], [phone] (Co-Occurring Disorders) & left a message seeking to refer a new patient  CM contacted [facility], [address] [facility] [address], [phone], but reached a recording for [facility] outpatient  Cm spoke Mike who said they don't offer individual therapy there  CM contacted [facility], [address] [facility], [address] [facility] [address], [phone] & left a message seeking to refer a new patient  CM contacted [facility], [address] [place] [address], [address] [place] [address], [phone] & left a message for intake

## 2017-12-14 NOTE — CASE MANAGEMENT
CM met with Pt & inquired if he had looked over the list of providers & decided what services he be open to? Pt said he didn't & he still doesn't know where he is going to stay  CM reviewed that d/c was tentative for tomorrow, & if Pt needed hospital transportation, CM would have to put the request in this morning, as the shuttle service needed 24 hour notice  Pt asked CM to put in a request, but he was going to talk to his father to see if he would pick him up  CM faxed [facility] Shuttle Service request for tomorrow

## 2017-12-15 VITALS
DIASTOLIC BLOOD PRESSURE: 68 MMHG | HEIGHT: 73 IN | TEMPERATURE: 97.6 F | WEIGHT: 218.6 LBS | OXYGEN SATURATION: 98 % | RESPIRATION RATE: 18 BRPM | BODY MASS INDEX: 28.97 KG/M2 | HEART RATE: 73 BPM | SYSTOLIC BLOOD PRESSURE: 111 MMHG

## 2017-12-15 RX ADMIN — BUPRENORPHINE HYDROCHLORIDE, NALOXONE HYDROCHLORIDE 2 FILM: 2; .5 FILM, SOLUBLE BUCCAL; SUBLINGUAL at 08:51

## 2017-12-15 RX ADMIN — BUPRENORPHINE HYDROCHLORIDE, NALOXONE HYDROCHLORIDE 1 FILM: 8; 2 FILM, SOLUBLE BUCCAL; SUBLINGUAL at 08:51

## 2017-12-15 RX ADMIN — BUPROPION HYDROCHLORIDE 300 MG: 300 TABLET, FILM COATED, EXTENDED RELEASE ORAL at 08:51

## 2017-12-15 RX ADMIN — NICOTINE 1 PATCH: 21 PATCH, EXTENDED RELEASE TRANSDERMAL at 08:49

## 2017-12-15 NOTE — PROGRESS NOTES
Pt is remaining labile during this evening  Pt became agitated on the phone, but after conversation would be calm and appropriate  Pt reports that he feel anxious regarding d/c tomorrow but believes he is ready  Pt denies SI/HI, and states his depression is improving  Pt reports no questions or concerns at this time  Will continue to monitor

## 2017-12-15 NOTE — DISCHARGE INSTR - LAB
Contact Information: If you have any questions, concerns, pended studies, tests and/or procedures, or emergencies regarding your inpatient behavioral health visit  Please contact [place] [facility] [phone] and ask to speak to a , nurse or physician  A contact is available 24 hours/ 7 days a week at this number  Summary of Procedures Performed During your Stay:  Below is a list of major procedures performed during your hospital stay and a summary of results:  - No major procedures performed  Pending Studies     None        If studies are pending at discharge, follow up with your PCP and/or referring provider

## 2017-12-15 NOTE — PROGRESS NOTES
Pleasant and cooperative during interaction  Expressed readiness for discharge  Father will be picking up patient and assisting him with obtaining automobile and returning to [place]  Plans to reside with girlfriend who does not drink or use drugs, but relationship is strained  Encouraged to broaden his sobriety network and attend NA mtgs  Encouraged compliance with medications and OP appointments  No anger or irritability  Improved mood and denies SI/HI  Spoke briefly about last admit here at [facility] in 2014  Refused influenza vaccine day of discharge  No questions or concerns

## 2017-12-15 NOTE — CASE MANAGEMENT
SANTHOSH received a return call from [facility], at 11:20 AM   She is accepting new patients, and CM was able to complete a referral   Ms Shlomo Logan specializes in co-occurring d/o & works with Dr Macarena Dior  Pt given an appointment for 12/20 at 11:00 AM   CM contacted Pt's cell phone @ [phone] & left he appointment information  CM contacted Pt's father, Wes Pimentel, @ [phone] but reached voicemail as well; appointment information left

## 2017-12-15 NOTE — DISCHARGE SUMMARY
Discharge Summary - 53 [facility] Ning 42 y o  male MRN: [id number]  Unit/Bed#: Iza Mackey 263-01 Encounter: [id number]     Admission Date: 12/11/2017         Discharge Date: 12/15/2017    Attending Psychiatrist: Kalyan Leon MD    Reason for Admission/HPI:   History of Present Illness   Patient is a 42year old male who presented to [address] [place] [address] ED with suicidal ideation with plan to walk into traffic  He was brought to ED unwillingly by father due to increased depression/anxiety (No SI) and released prior  He then had no ride, no supports, was forced to leave hospital, required police intervention, and then reported suicidal ideation towards police  Main stressors currently were listed as break up with girlfriend, homelessness, lack of finances, unemployment, and drug abuse  On current Suboxone maintenance and tested positive on UDS for Amphetamines, Benzodiazepines, Cocaine, and THC  He was focused on Benzodiazepines but agreed to not start due to addiction problems  He was not in agreement for psychiatric medication adjustments that he is currently prescribed by outpatient psychiatrist   He reported depressive symptoms of poor sleep, lack of energy, anhedonia, guilt, and hopelessness  He did report increased anxiety  He denied psychosis and did not endorse signs of jeanmarie  Patient does have prior inpatient psychiatric hospitalizations  Psychosocial Stressors: drugs, relationship, homelessness, finances, unemployment      Hospital Course:   Behavioral Health Medications:   current meds:   Current Facility-Administered Medications   Medication Dose Route Frequency    acetaminophen (TYLENOL) tablet 650 mg  650 mg Oral Q6H PRN    aluminum-magnesium hydroxide-simethicone (MYLANTA) 200-200-20 mg/5 mL oral suspension 30 mL  30 mL Oral Q4H PRN    benztropine (COGENTIN) injection 1 mg  1 mg Intramuscular Q6H PRN    benztropine (COGENTIN) tablet 1 mg  1 mg Oral Q6H PRN    buprenorphine-naloxone (SUBOXONE) 2-0 5 mg per SL film 2 Film  2 Film Sublingual Daily    buprenorphine-naloxone (SUBOXONE) 8-2 mg per SL film 1 Film  1 Film Sublingual Daily    buPROPion (WELLBUTRIN XL) 24 hr tablet 300 mg  300 mg Oral Daily    haloperidol (HALDOL) tablet 5 mg  5 mg Oral Q6H PRN    haloperidol lactate (HALDOL) injection 5 mg  5 mg Intramuscular Q6H PRN    hydrOXYzine HCL (ATARAX) tablet 50 mg  50 mg Oral Q6H PRN    influenza inactivated quadrivalent vaccine (FLULAVAL) IM injection 0 5 mL  0 5 mL Intramuscular Prior to discharge    LORazepam (ATIVAN) 2 mg/mL injection 2 mg  2 mg Intramuscular Q6H PRN    magnesium hydroxide (MILK OF MAGNESIA) 400 mg/5 mL oral suspension 30 mL  30 mL Oral Daily PRN    nicotine (NICODERM CQ) 21 mg/24 hr TD 24 hr patch 1 patch  1 patch Transdermal Daily    nicotine polacrilex (NICORETTE) gum 2 mg  2 mg Oral Q2H PRN    OLANZapine (ZyPREXA) IM injection 10 mg  10 mg Intramuscular Q3H PRN    OLANZapine (ZyPREXA) tablet 10 mg  10 mg Oral Q3H PRN    risperiDONE (RisperDAL M-TABS) dispersible tablet 1 mg  1 mg Oral Q3H PRN    traZODone (DESYREL) tablet 100 mg  100 mg Oral HS    traZODone (DESYREL) tablet 50 mg  50 mg Oral HS PRN     Patient was admitted to [address] [place] [address] Inpatient Psychiatric Unit on voluntary 201 commitment for safety and stabilization  On admission patient was continued on Wellbutrin XL 300mg QD for depression, Ambien 5mg HS for poor sleep, Hydroxyzine 50mg q6h PRN anxiety, and monitored for benzodiazepine withdrawal   Ambien was titrated to 10mg and discontinued  He reported relief with scheduled Trazodone 100mg HS  He was prescribed Hydroxyzine 50mg at discharge  He tolerated medications with no acute side effects  His mood brightened over the course of his treatment, and he was seen in [facility] interacting appropriately with peers  He did not demonstrate dangerous behavior to self or others during his inpatient stay    On day of discharge patient had reduced depression, controllable anxiety, denied psychosis, did not show signs of jeanmarie, did not show signs of benzodiazepine withdrawal, and denied suicidal/homicidal ideations  Mental Status at time of Discharge:     Appearance:  casually dressed   Behavior:  cooperative   Speech:  normal pitch and normal volume   Mood:  euthymic   Affect:  mood-congruent   Thought Process:  normal   Thought Content:  normal  Denied delusions/obsessions   Perceptual Disturbances: None   Risk Potential: Denied SI/HI  Potential for aggression: no   Sensorium:  person, place and time/date   Cognition:  grossly intact   Consciousness:  alert and awake    Attention: attention span and concentration were age appropriate   Insight:  fair   Judgment: fair   Gait/Station: normal gait/station and normal balance   Motor Activity: no abnormal movements     Discharge Diagnosis:   Severe episode of recurrent major depressive disorder, without psychotic features   Generalized anxiety disorder  Polysubstance Abuse    Discharge Medications:  See after visit summary for reconciled discharge medications provided to patient and family  Discharge instructions/Information to patient and family:   See after visit summary for information provided to patient and family  Provisions for Follow-Up Care:  See after visit summary for information related to follow-up care and any pertinent home health orders  Discharge Statement   I spent 32 minutes discharging the patient  This time was spent on the day of discharge  I had direct contact with the patient on the day of discharge  On day of discharge patient had mental status exam performed, discharge instructions/medications reviewed, and outpatient planning discussed  He was given 1 month of scripts  He denied tobacco cessation therapy or rehab services      Monse Hill PA-C

## 2017-12-15 NOTE — CASE MANAGEMENT
SANTHOSH met with Pt, who was in bed  CM woke him up to confirm that his father would be able to pick him up & he said yes  Pt said his dad would take him to a friend's house in [facility] he would then go get his truck in [place]  SANTHOSH reviewed that she had left message for several therapist, and was waiting to hear back  CM said that she would call Pt when she hears back from the providers  SANTHOSH inquired if Pt still had the provider list for [address] [place] [address] said that he did, and that he was also familiar with the [employer]  Pt verbalized understanding that if none of the providers SANTHOSH called are accepting new patients, Pt will need to set up his own appointment  Pt had no questions about his discharge
